# Patient Record
Sex: FEMALE | Race: WHITE | Employment: OTHER
[De-identification: names, ages, dates, MRNs, and addresses within clinical notes are randomized per-mention and may not be internally consistent; named-entity substitution may affect disease eponyms.]

---

## 2017-01-01 ENCOUNTER — HOME CARE VISIT (OUTPATIENT)
Dept: SCHEDULING | Facility: HOME HEALTH | Age: 82
End: 2017-01-01
Payer: MEDICARE

## 2017-01-01 ENCOUNTER — APPOINTMENT (OUTPATIENT)
Dept: GENERAL RADIOLOGY | Age: 82
End: 2017-01-01
Attending: NURSE PRACTITIONER
Payer: MEDICARE

## 2017-01-01 ENCOUNTER — HOSPICE ADMISSION (OUTPATIENT)
Dept: HOSPICE | Facility: HOSPICE | Age: 82
End: 2017-01-01

## 2017-01-01 ENCOUNTER — HOME CARE VISIT (OUTPATIENT)
Dept: HOSPICE | Facility: HOSPICE | Age: 82
End: 2017-01-01
Payer: MEDICARE

## 2017-01-01 ENCOUNTER — HOSPITAL ENCOUNTER (INPATIENT)
Age: 82
LOS: 1 days | Discharge: NURSING, REHAB OR LONG-TERM CARE | DRG: 190 | End: 2017-08-21
Attending: EMERGENCY MEDICINE | Admitting: HOSPITALIST
Payer: MEDICARE

## 2017-01-01 ENCOUNTER — APPOINTMENT (OUTPATIENT)
Dept: GENERAL RADIOLOGY | Age: 82
DRG: 177 | End: 2017-01-01
Attending: EMERGENCY MEDICINE
Payer: MEDICARE

## 2017-01-01 ENCOUNTER — HOSPICE ADMISSION (OUTPATIENT)
Dept: HOSPICE | Facility: HOSPICE | Age: 82
End: 2017-01-01
Payer: MEDICARE

## 2017-01-01 ENCOUNTER — HOSPITAL ENCOUNTER (EMERGENCY)
Age: 82
Discharge: HOME | End: 2017-06-18
Attending: EMERGENCY MEDICINE
Payer: MEDICARE

## 2017-01-01 ENCOUNTER — APPOINTMENT (OUTPATIENT)
Dept: CT IMAGING | Age: 82
End: 2017-01-01
Attending: NURSE PRACTITIONER
Payer: MEDICARE

## 2017-01-01 ENCOUNTER — APPOINTMENT (OUTPATIENT)
Dept: GENERAL RADIOLOGY | Age: 82
DRG: 190 | End: 2017-01-01
Attending: EMERGENCY MEDICINE
Payer: MEDICARE

## 2017-01-01 ENCOUNTER — HOSPITAL ENCOUNTER (INPATIENT)
Age: 82
LOS: 6 days | Discharge: HOSPICE | DRG: 177 | End: 2017-11-18
Attending: EMERGENCY MEDICINE | Admitting: HOSPITALIST
Payer: MEDICARE

## 2017-01-01 VITALS
WEIGHT: 90 LBS | RESPIRATION RATE: 20 BRPM | SYSTOLIC BLOOD PRESSURE: 138 MMHG | HEART RATE: 57 BPM | HEIGHT: 56 IN | BODY MASS INDEX: 20.24 KG/M2 | OXYGEN SATURATION: 95 % | DIASTOLIC BLOOD PRESSURE: 71 MMHG | TEMPERATURE: 98.3 F

## 2017-01-01 VITALS
RESPIRATION RATE: 18 BRPM | HEART RATE: 88 BPM | OXYGEN SATURATION: 97 % | SYSTOLIC BLOOD PRESSURE: 104 MMHG | DIASTOLIC BLOOD PRESSURE: 56 MMHG | TEMPERATURE: 98.6 F

## 2017-01-01 VITALS
SYSTOLIC BLOOD PRESSURE: 130 MMHG | BODY MASS INDEX: 19.63 KG/M2 | HEART RATE: 77 BPM | OXYGEN SATURATION: 95 % | WEIGHT: 100 LBS | TEMPERATURE: 98.4 F | RESPIRATION RATE: 14 BRPM | HEIGHT: 60 IN | DIASTOLIC BLOOD PRESSURE: 58 MMHG

## 2017-01-01 VITALS
OXYGEN SATURATION: 90 % | TEMPERATURE: 97.2 F | HEART RATE: 88 BPM | DIASTOLIC BLOOD PRESSURE: 62 MMHG | RESPIRATION RATE: 16 BRPM | SYSTOLIC BLOOD PRESSURE: 100 MMHG

## 2017-01-01 VITALS
SYSTOLIC BLOOD PRESSURE: 98 MMHG | HEART RATE: 88 BPM | DIASTOLIC BLOOD PRESSURE: 52 MMHG | OXYGEN SATURATION: 98 % | TEMPERATURE: 98.1 F | RESPIRATION RATE: 16 BRPM

## 2017-01-01 VITALS
RESPIRATION RATE: 14 BRPM | HEART RATE: 100 BPM | BODY MASS INDEX: 16.22 KG/M2 | OXYGEN SATURATION: 77 % | HEIGHT: 64 IN | WEIGHT: 95 LBS | DIASTOLIC BLOOD PRESSURE: 87 MMHG | SYSTOLIC BLOOD PRESSURE: 111 MMHG | TEMPERATURE: 98.2 F

## 2017-01-01 VITALS — HEART RATE: 69 BPM | OXYGEN SATURATION: 96 % | RESPIRATION RATE: 18 BRPM | TEMPERATURE: 98 F

## 2017-01-01 VITALS
TEMPERATURE: 99.1 F | OXYGEN SATURATION: 99 % | HEART RATE: 88 BPM | DIASTOLIC BLOOD PRESSURE: 52 MMHG | SYSTOLIC BLOOD PRESSURE: 98 MMHG | RESPIRATION RATE: 14 BRPM

## 2017-01-01 VITALS
TEMPERATURE: 99.2 F | OXYGEN SATURATION: 94 % | HEART RATE: 87 BPM | WEIGHT: 100 LBS | DIASTOLIC BLOOD PRESSURE: 62 MMHG | RESPIRATION RATE: 18 BRPM | BODY MASS INDEX: 18.4 KG/M2 | SYSTOLIC BLOOD PRESSURE: 113 MMHG | HEIGHT: 62 IN

## 2017-01-01 VITALS — OXYGEN SATURATION: 88 % | HEART RATE: 78 BPM | TEMPERATURE: 97.1 F | RESPIRATION RATE: 16 BRPM

## 2017-01-01 DIAGNOSIS — R53.81 DEBILITY: ICD-10-CM

## 2017-01-01 DIAGNOSIS — R19.7 DIARRHEA, UNSPECIFIED TYPE: ICD-10-CM

## 2017-01-01 DIAGNOSIS — Z71.89 ACP (ADVANCE CARE PLANNING): ICD-10-CM

## 2017-01-01 DIAGNOSIS — F02.81 ALZHEIMER'S DEMENTIA WITH BEHAVIORAL DISTURBANCE, UNSPECIFIED TIMING OF DEMENTIA ONSET: ICD-10-CM

## 2017-01-01 DIAGNOSIS — R19.5 POSITIVE OCCULT STOOL BLOOD TEST: ICD-10-CM

## 2017-01-01 DIAGNOSIS — R41.82 ALTERED MENTAL STATUS, UNSPECIFIED ALTERED MENTAL STATUS TYPE: ICD-10-CM

## 2017-01-01 DIAGNOSIS — T17.908A ASPIRATION INTO AIRWAY, INITIAL ENCOUNTER: Primary | ICD-10-CM

## 2017-01-01 DIAGNOSIS — G30.9 DEMENTIA IN ALZHEIMER'S DISEASE (HCC): ICD-10-CM

## 2017-01-01 DIAGNOSIS — R13.10 DYSPHAGIA, UNSPECIFIED TYPE: ICD-10-CM

## 2017-01-01 DIAGNOSIS — F02.80 DEMENTIA IN ALZHEIMER'S DISEASE (HCC): ICD-10-CM

## 2017-01-01 DIAGNOSIS — J18.9 PNEUMONIA DUE TO INFECTIOUS ORGANISM, UNSPECIFIED LATERALITY, UNSPECIFIED PART OF LUNG: ICD-10-CM

## 2017-01-01 DIAGNOSIS — R11.2 NAUSEA AND VOMITING IN ADULT: Primary | ICD-10-CM

## 2017-01-01 DIAGNOSIS — G30.9 ALZHEIMER'S DEMENTIA WITH BEHAVIORAL DISTURBANCE, UNSPECIFIED TIMING OF DEMENTIA ONSET: ICD-10-CM

## 2017-01-01 DIAGNOSIS — J18.9 HCAP (HEALTHCARE-ASSOCIATED PNEUMONIA): Primary | ICD-10-CM

## 2017-01-01 LAB
ALBUMIN SERPL BCP-MCNC: 3.1 G/DL (ref 3.4–5)
ALBUMIN SERPL-MCNC: 3 G/DL (ref 3.4–5)
ALBUMIN/GLOB SERPL: 0.6 {RATIO} (ref 0.8–1.7)
ALBUMIN/GLOB SERPL: 0.7 {RATIO} (ref 0.8–1.7)
ALP SERPL-CCNC: 104 U/L (ref 45–117)
ALP SERPL-CCNC: 115 U/L (ref 45–117)
ALT SERPL-CCNC: 16 U/L (ref 13–56)
ALT SERPL-CCNC: 26 U/L (ref 13–56)
ANION GAP BLD CALC-SCNC: 11 MMOL/L (ref 3–18)
ANION GAP SERPL CALC-SCNC: 10 MMOL/L (ref 3–18)
ANION GAP SERPL CALC-SCNC: 11 MMOL/L (ref 3–18)
ANION GAP SERPL CALC-SCNC: 7 MMOL/L (ref 3–18)
ANION GAP SERPL CALC-SCNC: 7 MMOL/L (ref 3–18)
ANION GAP SERPL CALC-SCNC: 8 MMOL/L (ref 3–18)
ANION GAP SERPL CALC-SCNC: 9 MMOL/L (ref 3–18)
ANION GAP SERPL CALC-SCNC: 9 MMOL/L (ref 3–18)
APPEARANCE UR: CLEAR
AST SERPL W P-5'-P-CCNC: 28 U/L (ref 15–37)
AST SERPL-CCNC: 18 U/L (ref 15–37)
ATRIAL RATE: 106 BPM
ATRIAL RATE: 107 BPM
ATRIAL RATE: 110 BPM
BACTERIA SPEC CULT: ABNORMAL
BACTERIA SPEC CULT: NORMAL
BASOPHILS # BLD AUTO: 0 K/UL (ref 0–0.06)
BASOPHILS # BLD: 0 % (ref 0–2)
BASOPHILS # BLD: 0 K/UL (ref 0–0.06)
BASOPHILS NFR BLD: 0 % (ref 0–2)
BILIRUB SERPL-MCNC: 0.3 MG/DL (ref 0.2–1)
BILIRUB SERPL-MCNC: 0.3 MG/DL (ref 0.2–1)
BILIRUB UR QL: NEGATIVE
BUN SERPL-MCNC: 11 MG/DL (ref 7–18)
BUN SERPL-MCNC: 11 MG/DL (ref 7–18)
BUN SERPL-MCNC: 13 MG/DL (ref 7–18)
BUN SERPL-MCNC: 14 MG/DL (ref 7–18)
BUN SERPL-MCNC: 17 MG/DL (ref 7–18)
BUN SERPL-MCNC: 23 MG/DL (ref 7–18)
BUN SERPL-MCNC: 23 MG/DL (ref 7–18)
BUN SERPL-MCNC: 27 MG/DL (ref 7–18)
BUN/CREAT SERPL: 18 (ref 12–20)
BUN/CREAT SERPL: 19 (ref 12–20)
BUN/CREAT SERPL: 20 (ref 12–20)
BUN/CREAT SERPL: 20 (ref 12–20)
BUN/CREAT SERPL: 21 (ref 12–20)
BUN/CREAT SERPL: 30 (ref 12–20)
BUN/CREAT SERPL: 31 (ref 12–20)
BUN/CREAT SERPL: 32 (ref 12–20)
CALCIUM SERPL-MCNC: 8.4 MG/DL (ref 8.5–10.1)
CALCIUM SERPL-MCNC: 8.5 MG/DL (ref 8.5–10.1)
CALCIUM SERPL-MCNC: 8.6 MG/DL (ref 8.5–10.1)
CALCIUM SERPL-MCNC: 8.7 MG/DL (ref 8.5–10.1)
CALCIUM SERPL-MCNC: 9.5 MG/DL (ref 8.5–10.1)
CALCIUM SERPL-MCNC: 9.5 MG/DL (ref 8.5–10.1)
CALCULATED P AXIS, ECG09: 19 DEGREES
CALCULATED P AXIS, ECG09: 31 DEGREES
CALCULATED P AXIS, ECG09: 56 DEGREES
CALCULATED R AXIS, ECG10: -50 DEGREES
CALCULATED R AXIS, ECG10: -50 DEGREES
CALCULATED R AXIS, ECG10: -53 DEGREES
CALCULATED T AXIS, ECG11: 33 DEGREES
CALCULATED T AXIS, ECG11: 54 DEGREES
CALCULATED T AXIS, ECG11: 79 DEGREES
CHLORIDE SERPL-SCNC: 104 MMOL/L (ref 100–108)
CHLORIDE SERPL-SCNC: 106 MMOL/L (ref 100–108)
CHLORIDE SERPL-SCNC: 106 MMOL/L (ref 100–108)
CHLORIDE SERPL-SCNC: 108 MMOL/L (ref 100–108)
CHLORIDE SERPL-SCNC: 109 MMOL/L (ref 100–108)
CHLORIDE SERPL-SCNC: 110 MMOL/L (ref 100–108)
CK MB CFR SERPL CALC: 2 % (ref 0–4)
CK MB CFR SERPL CALC: NORMAL % (ref 0–4)
CK MB SERPL-MCNC: 2.7 NG/ML (ref 5–25)
CK MB SERPL-MCNC: <1 NG/ML (ref 5–25)
CK SERPL-CCNC: 136 U/L (ref 26–192)
CK SERPL-CCNC: 56 U/L (ref 26–192)
CO2 SERPL-SCNC: 21 MMOL/L (ref 21–32)
CO2 SERPL-SCNC: 24 MMOL/L (ref 21–32)
CO2 SERPL-SCNC: 25 MMOL/L (ref 21–32)
CO2 SERPL-SCNC: 25 MMOL/L (ref 21–32)
CO2 SERPL-SCNC: 26 MMOL/L (ref 21–32)
CO2 SERPL-SCNC: 26 MMOL/L (ref 21–32)
CO2 SERPL-SCNC: 27 MMOL/L (ref 21–32)
CO2 SERPL-SCNC: 28 MMOL/L (ref 21–32)
COLOR UR: YELLOW
CREAT SERPL-MCNC: 0.58 MG/DL (ref 0.6–1.3)
CREAT SERPL-MCNC: 0.6 MG/DL (ref 0.6–1.3)
CREAT SERPL-MCNC: 0.64 MG/DL (ref 0.6–1.3)
CREAT SERPL-MCNC: 0.66 MG/DL (ref 0.6–1.3)
CREAT SERPL-MCNC: 0.73 MG/DL (ref 0.6–1.3)
CREAT SERPL-MCNC: 0.76 MG/DL (ref 0.6–1.3)
CREAT SERPL-MCNC: 0.86 MG/DL (ref 0.6–1.3)
CREAT SERPL-MCNC: 0.86 MG/DL (ref 0.6–1.3)
DATE LAST DOSE: ABNORMAL
DATE LAST DOSE: NORMAL
DIAGNOSIS, 93000: NORMAL
DIFFERENTIAL METHOD BLD: ABNORMAL
EOSINOPHIL # BLD: 0 K/UL (ref 0–0.4)
EOSINOPHIL # BLD: 0.1 K/UL (ref 0–0.4)
EOSINOPHIL NFR BLD: 0 % (ref 0–5)
EOSINOPHIL NFR BLD: 1 % (ref 0–5)
ERYTHROCYTE [DISTWIDTH] IN BLOOD BY AUTOMATED COUNT: 17.2 % (ref 11.6–14.5)
ERYTHROCYTE [DISTWIDTH] IN BLOOD BY AUTOMATED COUNT: 17.2 % (ref 11.6–14.5)
ERYTHROCYTE [DISTWIDTH] IN BLOOD BY AUTOMATED COUNT: 17.5 % (ref 11.6–14.5)
ERYTHROCYTE [DISTWIDTH] IN BLOOD BY AUTOMATED COUNT: 17.6 % (ref 11.6–14.5)
ERYTHROCYTE [DISTWIDTH] IN BLOOD BY AUTOMATED COUNT: 17.7 % (ref 11.6–14.5)
ERYTHROCYTE [DISTWIDTH] IN BLOOD BY AUTOMATED COUNT: 17.8 % (ref 11.6–14.5)
GLOBULIN SER CALC-MCNC: 4.7 G/DL (ref 2–4)
GLOBULIN SER CALC-MCNC: 5.4 G/DL (ref 2–4)
GLUCOSE BLD STRIP.AUTO-MCNC: 136 MG/DL (ref 70–110)
GLUCOSE SERPL-MCNC: 119 MG/DL (ref 74–99)
GLUCOSE SERPL-MCNC: 126 MG/DL (ref 74–99)
GLUCOSE SERPL-MCNC: 128 MG/DL (ref 74–99)
GLUCOSE SERPL-MCNC: 129 MG/DL (ref 74–99)
GLUCOSE SERPL-MCNC: 90 MG/DL (ref 74–99)
GLUCOSE SERPL-MCNC: 90 MG/DL (ref 74–99)
GLUCOSE SERPL-MCNC: 92 MG/DL (ref 74–99)
GLUCOSE SERPL-MCNC: 95 MG/DL (ref 74–99)
GLUCOSE UR STRIP.AUTO-MCNC: NEGATIVE MG/DL
GRAM STN SPEC: ABNORMAL
HCT VFR BLD AUTO: 24.6 % (ref 35–45)
HCT VFR BLD AUTO: 24.6 % (ref 35–45)
HCT VFR BLD AUTO: 26.8 % (ref 35–45)
HCT VFR BLD AUTO: 29.6 % (ref 35–45)
HCT VFR BLD AUTO: 31.1 % (ref 35–45)
HCT VFR BLD AUTO: 33.1 % (ref 35–45)
HGB BLD-MCNC: 10.1 G/DL (ref 12–16)
HGB BLD-MCNC: 7.3 G/DL (ref 12–16)
HGB BLD-MCNC: 7.4 G/DL (ref 12–16)
HGB BLD-MCNC: 8.2 G/DL (ref 12–16)
HGB BLD-MCNC: 9 G/DL (ref 12–16)
HGB BLD-MCNC: 9.6 G/DL (ref 12–16)
HGB UR QL STRIP: NEGATIVE
INR PPP: 1.1 (ref 0.8–1.2)
KETONES UR QL STRIP.AUTO: NEGATIVE MG/DL
LACTATE BLD-SCNC: 1 MMOL/L (ref 0.4–2)
LACTATE BLD-SCNC: 1.5 MMOL/L (ref 0.4–2)
LEUKOCYTE ESTERASE UR QL STRIP.AUTO: NEGATIVE
LYMPHOCYTES # BLD AUTO: 4 % (ref 21–52)
LYMPHOCYTES # BLD: 0.4 K/UL (ref 0.9–3.6)
LYMPHOCYTES # BLD: 1.2 K/UL (ref 0.9–3.6)
LYMPHOCYTES # BLD: 1.3 K/UL (ref 0.9–3.6)
LYMPHOCYTES # BLD: 1.5 K/UL (ref 0.9–3.6)
LYMPHOCYTES NFR BLD: 10 % (ref 21–52)
LYMPHOCYTES NFR BLD: 12 % (ref 21–52)
LYMPHOCYTES NFR BLD: 15 % (ref 21–52)
MAGNESIUM SERPL-MCNC: 2 MG/DL (ref 1.6–2.6)
MCH RBC QN AUTO: 24.9 PG (ref 24–34)
MCH RBC QN AUTO: 25.3 PG (ref 24–34)
MCH RBC QN AUTO: 25.3 PG (ref 24–34)
MCH RBC QN AUTO: 25.6 PG (ref 24–34)
MCH RBC QN AUTO: 25.7 PG (ref 24–34)
MCH RBC QN AUTO: 25.8 PG (ref 24–34)
MCHC RBC AUTO-ENTMCNC: 29.7 G/DL (ref 31–37)
MCHC RBC AUTO-ENTMCNC: 30.1 G/DL (ref 31–37)
MCHC RBC AUTO-ENTMCNC: 30.4 G/DL (ref 31–37)
MCHC RBC AUTO-ENTMCNC: 30.5 G/DL (ref 31–37)
MCHC RBC AUTO-ENTMCNC: 30.6 G/DL (ref 31–37)
MCHC RBC AUTO-ENTMCNC: 30.9 G/DL (ref 31–37)
MCV RBC AUTO: 82.9 FL (ref 74–97)
MCV RBC AUTO: 83.1 FL (ref 74–97)
MCV RBC AUTO: 84 FL (ref 74–97)
MCV RBC AUTO: 84 FL (ref 74–97)
MCV RBC AUTO: 84.2 FL (ref 74–97)
MCV RBC AUTO: 84.7 FL (ref 74–97)
MONOCYTES # BLD: 0.2 K/UL (ref 0.05–1.2)
MONOCYTES # BLD: 0.5 K/UL (ref 0.05–1.2)
MONOCYTES # BLD: 0.5 K/UL (ref 0.05–1.2)
MONOCYTES # BLD: 0.6 K/UL (ref 0.05–1.2)
MONOCYTES NFR BLD AUTO: 2 % (ref 3–10)
MONOCYTES NFR BLD: 4 % (ref 3–10)
MONOCYTES NFR BLD: 6 % (ref 3–10)
MONOCYTES NFR BLD: 6 % (ref 3–10)
NEUTS SEG # BLD: 13.1 K/UL (ref 1.8–8)
NEUTS SEG # BLD: 7.3 K/UL (ref 1.8–8)
NEUTS SEG # BLD: 8.1 K/UL (ref 1.8–8)
NEUTS SEG # BLD: 9.4 K/UL (ref 1.8–8)
NEUTS SEG NFR BLD AUTO: 94 % (ref 40–73)
NEUTS SEG NFR BLD: 78 % (ref 40–73)
NEUTS SEG NFR BLD: 81 % (ref 40–73)
NEUTS SEG NFR BLD: 85 % (ref 40–73)
NITRITE UR QL STRIP.AUTO: NEGATIVE
P-R INTERVAL, ECG05: 124 MS
P-R INTERVAL, ECG05: 142 MS
P-R INTERVAL, ECG05: 158 MS
PH UR STRIP: 5 [PH] (ref 5–8)
PH UR STRIP: 5 [PH] (ref 5–8)
PH UR STRIP: 6 [PH] (ref 5–8)
PLATELET # BLD AUTO: 391 K/UL (ref 135–420)
PLATELET # BLD AUTO: 438 K/UL (ref 135–420)
PLATELET # BLD AUTO: 441 K/UL (ref 135–420)
PLATELET # BLD AUTO: 501 K/UL (ref 135–420)
PLATELET # BLD AUTO: 516 K/UL (ref 135–420)
PLATELET # BLD AUTO: 525 K/UL (ref 135–420)
PMV BLD AUTO: 8.5 FL (ref 9.2–11.8)
PMV BLD AUTO: 8.8 FL (ref 9.2–11.8)
PMV BLD AUTO: 8.9 FL (ref 9.2–11.8)
PMV BLD AUTO: 9 FL (ref 9.2–11.8)
PMV BLD AUTO: 9.1 FL (ref 9.2–11.8)
PMV BLD AUTO: 9.1 FL (ref 9.2–11.8)
POTASSIUM SERPL-SCNC: 3.5 MMOL/L (ref 3.5–5.5)
POTASSIUM SERPL-SCNC: 3.5 MMOL/L (ref 3.5–5.5)
POTASSIUM SERPL-SCNC: 3.9 MMOL/L (ref 3.5–5.5)
POTASSIUM SERPL-SCNC: 4.1 MMOL/L (ref 3.5–5.5)
POTASSIUM SERPL-SCNC: 4.2 MMOL/L (ref 3.5–5.5)
PROT SERPL-MCNC: 7.8 G/DL (ref 6.4–8.2)
PROT SERPL-MCNC: 8.4 G/DL (ref 6.4–8.2)
PROT UR STRIP-MCNC: NEGATIVE MG/DL
PROTHROMBIN TIME: 14 SEC (ref 11.5–15.2)
Q-T INTERVAL, ECG07: 328 MS
Q-T INTERVAL, ECG07: 336 MS
Q-T INTERVAL, ECG07: 336 MS
QRS DURATION, ECG06: 100 MS
QRS DURATION, ECG06: 86 MS
QRS DURATION, ECG06: 90 MS
QTC CALCULATION (BEZET), ECG08: 437 MS
QTC CALCULATION (BEZET), ECG08: 446 MS
QTC CALCULATION (BEZET), ECG08: 454 MS
RBC # BLD AUTO: 2.92 M/UL (ref 4.2–5.3)
RBC # BLD AUTO: 2.93 M/UL (ref 4.2–5.3)
RBC # BLD AUTO: 3.19 M/UL (ref 4.2–5.3)
RBC # BLD AUTO: 3.56 M/UL (ref 4.2–5.3)
RBC # BLD AUTO: 3.75 M/UL (ref 4.2–5.3)
RBC # BLD AUTO: 3.91 M/UL (ref 4.2–5.3)
REPORTED DOSE,DOSE: ABNORMAL UNITS
REPORTED DOSE,DOSE: NORMAL UNITS
REPORTED DOSE/TIME,TMG: 1600
REPORTED DOSE/TIME,TMG: 500
SERVICE CMNT-IMP: ABNORMAL
SERVICE CMNT-IMP: NORMAL
SODIUM SERPL-SCNC: 139 MMOL/L (ref 136–145)
SODIUM SERPL-SCNC: 140 MMOL/L (ref 136–145)
SODIUM SERPL-SCNC: 142 MMOL/L (ref 136–145)
SODIUM SERPL-SCNC: 143 MMOL/L (ref 136–145)
SODIUM SERPL-SCNC: 143 MMOL/L (ref 136–145)
SODIUM SERPL-SCNC: 144 MMOL/L (ref 136–145)
SP GR UR REFRACTOMETRY: 1.02 (ref 1–1.03)
TROPONIN I SERPL-MCNC: <0.02 NG/ML (ref 0–0.04)
TROPONIN I SERPL-MCNC: <0.02 NG/ML (ref 0–0.04)
UROBILINOGEN UR QL STRIP.AUTO: 0.2 EU/DL (ref 0.2–1)
UROBILINOGEN UR QL STRIP.AUTO: 0.2 EU/DL (ref 0.2–1)
UROBILINOGEN UR QL STRIP.AUTO: 1 EU/DL (ref 0.2–1)
VANCOMYCIN TROUGH SERPL-MCNC: 13.7 UG/ML (ref 10–20)
VANCOMYCIN TROUGH SERPL-MCNC: 7 UG/ML (ref 10–20)
VENTRICULAR RATE, ECG03: 106 BPM
VENTRICULAR RATE, ECG03: 107 BPM
VENTRICULAR RATE, ECG03: 110 BPM
WBC # BLD AUTO: 10 K/UL (ref 4.6–13.2)
WBC # BLD AUTO: 10.1 K/UL (ref 4.6–13.2)
WBC # BLD AUTO: 15.3 K/UL (ref 4.6–13.2)
WBC # BLD AUTO: 7.5 K/UL (ref 4.6–13.2)
WBC # BLD AUTO: 8.5 K/UL (ref 4.6–13.2)
WBC # BLD AUTO: 9.3 K/UL (ref 4.6–13.2)

## 2017-01-01 PROCEDURE — 74011250636 HC RX REV CODE- 250/636: Performed by: EMERGENCY MEDICINE

## 2017-01-01 PROCEDURE — G0156 HHCP-SVS OF AIDE,EA 15 MIN: HCPCS

## 2017-01-01 PROCEDURE — 0651 HSPC ROUTINE HOME CARE

## 2017-01-01 PROCEDURE — 74011000258 HC RX REV CODE- 258: Performed by: HOSPITALIST

## 2017-01-01 PROCEDURE — 65270000029 HC RM PRIVATE

## 2017-01-01 PROCEDURE — 77030011943

## 2017-01-01 PROCEDURE — T4541 LARGE DISPOSABLE UNDERPAD: HCPCS

## 2017-01-01 PROCEDURE — 77010033678 HC OXYGEN DAILY

## 2017-01-01 PROCEDURE — A9270 NON-COVERED ITEM OR SERVICE: HCPCS

## 2017-01-01 PROCEDURE — 99218 HC RM OBSERVATION: CPT

## 2017-01-01 PROCEDURE — A4927 NON-STERILE GLOVES: HCPCS

## 2017-01-01 PROCEDURE — G0299 HHS/HOSPICE OF RN EA 15 MIN: HCPCS

## 2017-01-01 PROCEDURE — 77030020253 HC SOL INJ D545NS .05 DEX .45 SAL

## 2017-01-01 PROCEDURE — 74011250637 HC RX REV CODE- 250/637: Performed by: HOSPITALIST

## 2017-01-01 PROCEDURE — 36415 COLL VENOUS BLD VENIPUNCTURE: CPT | Performed by: HOSPITALIST

## 2017-01-01 PROCEDURE — 80202 ASSAY OF VANCOMYCIN: CPT | Performed by: HOSPITALIST

## 2017-01-01 PROCEDURE — 87086 URINE CULTURE/COLONY COUNT: CPT | Performed by: EMERGENCY MEDICINE

## 2017-01-01 PROCEDURE — 74011000258 HC RX REV CODE- 258: Performed by: EMERGENCY MEDICINE

## 2017-01-01 PROCEDURE — 93005 ELECTROCARDIOGRAM TRACING: CPT

## 2017-01-01 PROCEDURE — 99285 EMERGENCY DEPT VISIT HI MDM: CPT

## 2017-01-01 PROCEDURE — B4100 FOOD THICKENER ORAL: HCPCS

## 2017-01-01 PROCEDURE — 80053 COMPREHEN METABOLIC PANEL: CPT | Performed by: NURSE PRACTITIONER

## 2017-01-01 PROCEDURE — 85027 COMPLETE CBC AUTOMATED: CPT | Performed by: HOSPITALIST

## 2017-01-01 PROCEDURE — 77030020256 HC SOL INJ NACL 0.9%  500ML

## 2017-01-01 PROCEDURE — 74011250636 HC RX REV CODE- 250/636: Performed by: HOSPITALIST

## 2017-01-01 PROCEDURE — T4522 ADULT SIZE BRIEF/DIAPER MED: HCPCS

## 2017-01-01 PROCEDURE — 85025 COMPLETE CBC W/AUTO DIFF WBC: CPT | Performed by: NURSE PRACTITIONER

## 2017-01-01 PROCEDURE — 74011000258 HC RX REV CODE- 258: Performed by: FAMILY MEDICINE

## 2017-01-01 PROCEDURE — 81003 URINALYSIS AUTO W/O SCOPE: CPT | Performed by: EMERGENCY MEDICINE

## 2017-01-01 PROCEDURE — 51701 INSERT BLADDER CATHETER: CPT

## 2017-01-01 PROCEDURE — A6250 SKIN SEAL PROTECT MOISTURIZR: HCPCS

## 2017-01-01 PROCEDURE — 87040 BLOOD CULTURE FOR BACTERIA: CPT | Performed by: EMERGENCY MEDICINE

## 2017-01-01 PROCEDURE — 96365 THER/PROPH/DIAG IV INF INIT: CPT

## 2017-01-01 PROCEDURE — 74011000250 HC RX REV CODE- 250: Performed by: EMERGENCY MEDICINE

## 2017-01-01 PROCEDURE — 80053 COMPREHEN METABOLIC PANEL: CPT | Performed by: EMERGENCY MEDICINE

## 2017-01-01 PROCEDURE — 87186 SC STD MICRODIL/AGAR DIL: CPT | Performed by: EMERGENCY MEDICINE

## 2017-01-01 PROCEDURE — 3336500001 HSPC ELECTION

## 2017-01-01 PROCEDURE — 80048 BASIC METABOLIC PNL TOTAL CA: CPT | Performed by: HOSPITALIST

## 2017-01-01 PROCEDURE — 71010 XR CHEST PORT: CPT

## 2017-01-01 PROCEDURE — 96361 HYDRATE IV INFUSION ADD-ON: CPT

## 2017-01-01 PROCEDURE — 74011250636 HC RX REV CODE- 250/636: Performed by: FAMILY MEDICINE

## 2017-01-01 PROCEDURE — 96375 TX/PRO/DX INJ NEW DRUG ADDON: CPT

## 2017-01-01 PROCEDURE — 99284 EMERGENCY DEPT VISIT MOD MDM: CPT

## 2017-01-01 PROCEDURE — HOSPICE MEDICATION HC HH HOSPICE MEDICATION

## 2017-01-01 PROCEDURE — 36415 COLL VENOUS BLD VENIPUNCTURE: CPT | Performed by: FAMILY MEDICINE

## 2017-01-01 PROCEDURE — 85025 COMPLETE CBC W/AUTO DIFF WBC: CPT | Performed by: FAMILY MEDICINE

## 2017-01-01 PROCEDURE — 92610 EVALUATE SWALLOWING FUNCTION: CPT

## 2017-01-01 PROCEDURE — 87077 CULTURE AEROBIC IDENTIFY: CPT | Performed by: EMERGENCY MEDICINE

## 2017-01-01 PROCEDURE — 87070 CULTURE OTHR SPECIMN AEROBIC: CPT | Performed by: EMERGENCY MEDICINE

## 2017-01-01 PROCEDURE — 85025 COMPLETE CBC W/AUTO DIFF WBC: CPT | Performed by: EMERGENCY MEDICINE

## 2017-01-01 PROCEDURE — 80048 BASIC METABOLIC PNL TOTAL CA: CPT | Performed by: FAMILY MEDICINE

## 2017-01-01 PROCEDURE — 92526 ORAL FUNCTION THERAPY: CPT

## 2017-01-01 PROCEDURE — 83605 ASSAY OF LACTIC ACID: CPT

## 2017-01-01 PROCEDURE — 83735 ASSAY OF MAGNESIUM: CPT | Performed by: NURSE PRACTITIONER

## 2017-01-01 PROCEDURE — 51702 INSERT TEMP BLADDER CATH: CPT

## 2017-01-01 PROCEDURE — 77030034849

## 2017-01-01 PROCEDURE — G0155 HHCP-SVS OF CSW,EA 15 MIN: HCPCS

## 2017-01-01 PROCEDURE — G0300 HHS/HOSPICE OF LPN EA 15 MIN: HCPCS

## 2017-01-01 PROCEDURE — 77030011256 HC DRSG MEPILEX <16IN NO BORD MOLN -A

## 2017-01-01 PROCEDURE — 82550 ASSAY OF CK (CPK): CPT | Performed by: EMERGENCY MEDICINE

## 2017-01-01 PROCEDURE — 82550 ASSAY OF CK (CPK): CPT | Performed by: NURSE PRACTITIONER

## 2017-01-01 PROCEDURE — 80048 BASIC METABOLIC PNL TOTAL CA: CPT | Performed by: EMERGENCY MEDICINE

## 2017-01-01 PROCEDURE — 82962 GLUCOSE BLOOD TEST: CPT

## 2017-01-01 PROCEDURE — 81003 URINALYSIS AUTO W/O SCOPE: CPT | Performed by: NURSE PRACTITIONER

## 2017-01-01 PROCEDURE — 74177 CT ABD & PELVIS W/CONTRAST: CPT

## 2017-01-01 PROCEDURE — 85610 PROTHROMBIN TIME: CPT | Performed by: FAMILY MEDICINE

## 2017-01-01 PROCEDURE — 36415 COLL VENOUS BLD VENIPUNCTURE: CPT | Performed by: NURSE PRACTITIONER

## 2017-01-01 PROCEDURE — 80048 BASIC METABOLIC PNL TOTAL CA: CPT | Performed by: NURSE PRACTITIONER

## 2017-01-01 PROCEDURE — 74011636320 HC RX REV CODE- 636/320: Performed by: EMERGENCY MEDICINE

## 2017-01-01 PROCEDURE — 76450000000

## 2017-01-01 RX ORDER — ATORVASTATIN CALCIUM 20 MG/1
20 TABLET, FILM COATED ORAL
Status: DISCONTINUED | OUTPATIENT
Start: 2017-01-01 | End: 2017-01-01

## 2017-01-01 RX ORDER — AMOXICILLIN 250 MG
1 CAPSULE ORAL 2 TIMES DAILY
COMMUNITY
End: 2017-01-01 | Stop reason: SDUPTHER

## 2017-01-01 RX ORDER — ACETAMINOPHEN 650 MG/1
650 SUPPOSITORY RECTAL
Status: DISCONTINUED | OUTPATIENT
Start: 2017-01-01 | End: 2017-01-01 | Stop reason: HOSPADM

## 2017-01-01 RX ORDER — GUAIFENESIN 100 MG/5ML
300 SOLUTION ORAL
COMMUNITY
End: 2017-01-01

## 2017-01-01 RX ORDER — LANOLIN ALCOHOL/MO/W.PET/CERES
3 CREAM (GRAM) TOPICAL
Status: DISCONTINUED | OUTPATIENT
Start: 2017-01-01 | End: 2017-01-01 | Stop reason: HOSPADM

## 2017-01-01 RX ORDER — ADHESIVE BANDAGE
30 BANDAGE TOPICAL
COMMUNITY
End: 2018-01-01

## 2017-01-01 RX ORDER — RANITIDINE 150 MG/1
150 TABLET, FILM COATED ORAL DAILY
Status: DISCONTINUED | OUTPATIENT
Start: 2017-01-01 | End: 2017-01-01 | Stop reason: HOSPADM

## 2017-01-01 RX ORDER — ESCITALOPRAM OXALATE 10 MG/1
10 TABLET ORAL DAILY
Status: DISCONTINUED | OUTPATIENT
Start: 2017-01-01 | End: 2017-01-01 | Stop reason: HOSPADM

## 2017-01-01 RX ORDER — MAG HYDROX/ALUMINUM HYD/SIMETH 200-200-20
30 SUSPENSION, ORAL (FINAL DOSE FORM) ORAL
Status: DISCONTINUED | OUTPATIENT
Start: 2017-01-01 | End: 2017-01-01 | Stop reason: HOSPADM

## 2017-01-01 RX ORDER — ONDANSETRON 2 MG/ML
4 INJECTION INTRAMUSCULAR; INTRAVENOUS
Status: DISCONTINUED | OUTPATIENT
Start: 2017-01-01 | End: 2017-01-01 | Stop reason: HOSPADM

## 2017-01-01 RX ORDER — MORPHINE SULFATE 20 MG/ML
5 SOLUTION ORAL
Qty: 30 ML | Refills: 0 | Status: SHIPPED | OUTPATIENT
Start: 2017-01-01

## 2017-01-01 RX ORDER — ONDANSETRON 4 MG/1
4 TABLET, ORALLY DISINTEGRATING ORAL
Qty: 10 TAB | Refills: 0 | Status: SHIPPED | OUTPATIENT
Start: 2017-01-01

## 2017-01-01 RX ORDER — MEMANTINE HYDROCHLORIDE 28 MG/1
28 CAPSULE, EXTENDED RELEASE ORAL DAILY
Status: DISCONTINUED | OUTPATIENT
Start: 2017-01-01 | End: 2017-01-01

## 2017-01-01 RX ORDER — LEVOFLOXACIN 5 MG/ML
750 INJECTION, SOLUTION INTRAVENOUS
Status: DISCONTINUED | OUTPATIENT
Start: 2017-01-01 | End: 2017-01-01

## 2017-01-01 RX ORDER — HYDROXYZINE 25 MG/1
25 TABLET, FILM COATED ORAL
COMMUNITY
End: 2018-01-01

## 2017-01-01 RX ORDER — ADHESIVE BANDAGE
30 BANDAGE TOPICAL
Status: DISCONTINUED | OUTPATIENT
Start: 2017-01-01 | End: 2017-01-01 | Stop reason: HOSPADM

## 2017-01-01 RX ORDER — LEVOFLOXACIN 5 MG/ML
750 INJECTION, SOLUTION INTRAVENOUS
Status: COMPLETED | OUTPATIENT
Start: 2017-01-01 | End: 2017-01-01

## 2017-01-01 RX ORDER — SODIUM CHLORIDE 0.9 % (FLUSH) 0.9 %
5-10 SYRINGE (ML) INJECTION AS NEEDED
Status: DISCONTINUED | OUTPATIENT
Start: 2017-01-01 | End: 2017-01-01 | Stop reason: HOSPADM

## 2017-01-01 RX ORDER — ACETAMINOPHEN 325 MG/1
650 TABLET ORAL
COMMUNITY
End: 2018-01-01

## 2017-01-01 RX ORDER — MORPHINE SULFATE 20 MG/ML
5 SOLUTION ORAL
Status: ON HOLD | COMMUNITY
End: 2017-01-01

## 2017-01-01 RX ORDER — ENOXAPARIN SODIUM 100 MG/ML
40 INJECTION SUBCUTANEOUS EVERY 24 HOURS
Status: DISCONTINUED | OUTPATIENT
Start: 2017-01-01 | End: 2017-01-01 | Stop reason: HOSPADM

## 2017-01-01 RX ORDER — LEVOFLOXACIN 750 MG/1
750 TABLET ORAL DAILY
Qty: 5 TAB | Refills: 0 | Status: SHIPPED | OUTPATIENT
Start: 2017-01-01 | End: 2017-01-01

## 2017-01-01 RX ORDER — LANOLIN ALCOHOL/MO/W.PET/CERES
3 CREAM (GRAM) TOPICAL
COMMUNITY

## 2017-01-01 RX ORDER — ATROPINE SULFATE 10 MG/ML
2 SOLUTION/ DROPS OPHTHALMIC
COMMUNITY
End: 2017-01-01

## 2017-01-01 RX ORDER — LORAZEPAM 2 MG/ML
1 CONCENTRATE ORAL
Status: ON HOLD | COMMUNITY
End: 2017-01-01

## 2017-01-01 RX ORDER — SODIUM CHLORIDE 9 MG/ML
125 INJECTION, SOLUTION INTRAVENOUS CONTINUOUS
Status: DISCONTINUED | OUTPATIENT
Start: 2017-01-01 | End: 2017-01-01

## 2017-01-01 RX ORDER — ENOXAPARIN SODIUM 100 MG/ML
30 INJECTION SUBCUTANEOUS EVERY 24 HOURS
Status: DISCONTINUED | OUTPATIENT
Start: 2017-01-01 | End: 2017-01-01 | Stop reason: HOSPADM

## 2017-01-01 RX ORDER — LEVOFLOXACIN 5 MG/ML
750 INJECTION, SOLUTION INTRAVENOUS
Status: DISCONTINUED | OUTPATIENT
Start: 2017-01-01 | End: 2017-01-01 | Stop reason: HOSPADM

## 2017-01-01 RX ORDER — DEXTROSE MONOHYDRATE AND SODIUM CHLORIDE 5; .45 G/100ML; G/100ML
50 INJECTION, SOLUTION INTRAVENOUS CONTINUOUS
Status: DISCONTINUED | OUTPATIENT
Start: 2017-01-01 | End: 2017-01-01

## 2017-01-01 RX ORDER — LORAZEPAM 2 MG/ML
1 CONCENTRATE ORAL
Qty: 30 ML | Refills: 0 | Status: SHIPPED | OUTPATIENT
Start: 2017-01-01

## 2017-01-01 RX ORDER — RISPERIDONE 0.25 MG/1
0.25 TABLET, FILM COATED ORAL 2 TIMES DAILY
Status: DISCONTINUED | OUTPATIENT
Start: 2017-01-01 | End: 2017-01-01 | Stop reason: HOSPADM

## 2017-01-01 RX ORDER — DONEPEZIL HYDROCHLORIDE 23 MG/1
23 TABLET, FILM COATED ORAL
Status: DISCONTINUED | OUTPATIENT
Start: 2017-01-01 | End: 2017-01-01

## 2017-01-01 RX ORDER — ACETAMINOPHEN 325 MG/1
650 TABLET ORAL
Status: DISCONTINUED | OUTPATIENT
Start: 2017-01-01 | End: 2017-01-01 | Stop reason: HOSPADM

## 2017-01-01 RX ORDER — DEXTROSE MONOHYDRATE AND SODIUM CHLORIDE 5; .45 G/100ML; G/100ML
75 INJECTION, SOLUTION INTRAVENOUS CONTINUOUS
Status: DISCONTINUED | OUTPATIENT
Start: 2017-01-01 | End: 2017-01-01 | Stop reason: HOSPADM

## 2017-01-01 RX ORDER — LANOLIN ALCOHOL/MO/W.PET/CERES
1 CREAM (GRAM) TOPICAL
Status: DISCONTINUED | OUTPATIENT
Start: 2017-01-01 | End: 2017-01-01

## 2017-01-01 RX ADMIN — ATORVASTATIN CALCIUM 20 MG: 20 TABLET, FILM COATED ORAL at 21:24

## 2017-01-01 RX ADMIN — MELATONIN TAB 3 MG 3 MG: 3 TAB at 22:45

## 2017-01-01 RX ADMIN — AZTREONAM 2 G: 2 INJECTION, POWDER, LYOPHILIZED, FOR SOLUTION INTRAMUSCULAR; INTRAVENOUS at 08:33

## 2017-01-01 RX ADMIN — LACTULOSE 20 G: 20 SOLUTION ORAL at 09:28

## 2017-01-01 RX ADMIN — RISPERIDONE 0.25 MG: 0.25 TABLET, FILM COATED ORAL at 17:44

## 2017-01-01 RX ADMIN — RISPERIDONE 0.25 MG: 0.25 TABLET, FILM COATED ORAL at 17:59

## 2017-01-01 RX ADMIN — DEXTROSE MONOHYDRATE AND SODIUM CHLORIDE 50 ML/HR: 5; .45 INJECTION, SOLUTION INTRAVENOUS at 20:34

## 2017-01-01 RX ADMIN — ENOXAPARIN SODIUM 30 MG: 30 INJECTION SUBCUTANEOUS at 19:03

## 2017-01-01 RX ADMIN — SODIUM CHLORIDE 750 MG: 900 INJECTION, SOLUTION INTRAVENOUS at 01:00

## 2017-01-01 RX ADMIN — DEXTROSE MONOHYDRATE AND SODIUM CHLORIDE 50 ML/HR: 5; .45 INJECTION, SOLUTION INTRAVENOUS at 15:35

## 2017-01-01 RX ADMIN — SODIUM CHLORIDE 750 MG: 900 INJECTION, SOLUTION INTRAVENOUS at 14:53

## 2017-01-01 RX ADMIN — DEXTROSE MONOHYDRATE AND SODIUM CHLORIDE 75 ML/HR: 5; .45 INJECTION, SOLUTION INTRAVENOUS at 01:59

## 2017-01-01 RX ADMIN — SODIUM CHLORIDE 1250 MG: 900 INJECTION, SOLUTION INTRAVENOUS at 16:03

## 2017-01-01 RX ADMIN — DEXTROSE MONOHYDRATE AND SODIUM CHLORIDE 50 ML/HR: 5; .45 INJECTION, SOLUTION INTRAVENOUS at 03:00

## 2017-01-01 RX ADMIN — SODIUM CHLORIDE 750 MG: 900 INJECTION, SOLUTION INTRAVENOUS at 15:35

## 2017-01-01 RX ADMIN — SODIUM CHLORIDE 750 MG: 900 INJECTION, SOLUTION INTRAVENOUS at 16:40

## 2017-01-01 RX ADMIN — RISPERIDONE 0.25 MG: 0.25 TABLET, FILM COATED ORAL at 10:00

## 2017-01-01 RX ADMIN — RISPERIDONE 0.25 MG: 0.25 TABLET, FILM COATED ORAL at 18:26

## 2017-01-01 RX ADMIN — LACTULOSE 20 G: 20 SOLUTION ORAL at 09:17

## 2017-01-01 RX ADMIN — RANITIDINE HYDROCHLORIDE 150 MG: 150 TABLET, FILM COATED ORAL at 09:17

## 2017-01-01 RX ADMIN — AZTREONAM 2 G: 2 INJECTION, POWDER, LYOPHILIZED, FOR SOLUTION INTRAMUSCULAR; INTRAVENOUS at 09:58

## 2017-01-01 RX ADMIN — AZTREONAM 2 G: 2 INJECTION, POWDER, LYOPHILIZED, FOR SOLUTION INTRAMUSCULAR; INTRAVENOUS at 15:26

## 2017-01-01 RX ADMIN — DEXTROSE MONOHYDRATE AND SODIUM CHLORIDE 50 ML/HR: 5; .45 INJECTION, SOLUTION INTRAVENOUS at 04:38

## 2017-01-01 RX ADMIN — SODIUM CHLORIDE 1000 ML: 900 INJECTION, SOLUTION INTRAVENOUS at 23:37

## 2017-01-01 RX ADMIN — MELATONIN TAB 3 MG 3 MG: 3 TAB at 22:29

## 2017-01-01 RX ADMIN — ENOXAPARIN SODIUM 30 MG: 30 INJECTION SUBCUTANEOUS at 20:35

## 2017-01-01 RX ADMIN — RISPERIDONE 0.25 MG: 0.25 TABLET, FILM COATED ORAL at 17:40

## 2017-01-01 RX ADMIN — LACTULOSE 20 G: 20 SOLUTION ORAL at 18:05

## 2017-01-01 RX ADMIN — RANITIDINE HYDROCHLORIDE 150 MG: 150 TABLET, FILM COATED ORAL at 13:06

## 2017-01-01 RX ADMIN — FERROUS SULFATE TAB 325 MG (65 MG ELEMENTAL FE) 325 MG: 325 (65 FE) TAB at 13:06

## 2017-01-01 RX ADMIN — RANITIDINE HYDROCHLORIDE 150 MG: 150 TABLET, FILM COATED ORAL at 09:26

## 2017-01-01 RX ADMIN — LEVOFLOXACIN 750 MG: 5 INJECTION, SOLUTION INTRAVENOUS at 20:35

## 2017-01-01 RX ADMIN — ESCITALOPRAM OXALATE 10 MG: 10 TABLET ORAL at 10:00

## 2017-01-01 RX ADMIN — AZTREONAM 2 G: 2 INJECTION, POWDER, LYOPHILIZED, FOR SOLUTION INTRAMUSCULAR; INTRAVENOUS at 00:10

## 2017-01-01 RX ADMIN — LEVOFLOXACIN 750 MG: 750 INJECTION, SOLUTION INTRAVENOUS at 23:41

## 2017-01-01 RX ADMIN — RANITIDINE HYDROCHLORIDE 150 MG: 150 TABLET, FILM COATED ORAL at 10:00

## 2017-01-01 RX ADMIN — SODIUM CHLORIDE 500 MG: 900 INJECTION, SOLUTION INTRAVENOUS at 05:26

## 2017-01-01 RX ADMIN — IOPAMIDOL 95 ML: 612 INJECTION, SOLUTION INTRAVENOUS at 11:59

## 2017-01-01 RX ADMIN — LACTULOSE 20 G: 20 SOLUTION ORAL at 18:26

## 2017-01-01 RX ADMIN — ESCITALOPRAM OXALATE 10 MG: 10 TABLET ORAL at 13:06

## 2017-01-01 RX ADMIN — ENOXAPARIN SODIUM 40 MG: 40 INJECTION SUBCUTANEOUS at 03:56

## 2017-01-01 RX ADMIN — ESCITALOPRAM OXALATE 10 MG: 10 TABLET ORAL at 09:26

## 2017-01-01 RX ADMIN — SODIUM CHLORIDE 500 MG: 900 INJECTION, SOLUTION INTRAVENOUS at 08:31

## 2017-01-01 RX ADMIN — RISPERIDONE 0.25 MG: 0.25 TABLET, FILM COATED ORAL at 18:04

## 2017-01-01 RX ADMIN — ESCITALOPRAM OXALATE 10 MG: 10 TABLET ORAL at 09:17

## 2017-01-01 RX ADMIN — SODIUM CHLORIDE 750 MG: 900 INJECTION, SOLUTION INTRAVENOUS at 06:20

## 2017-01-01 RX ADMIN — SODIUM CHLORIDE 1000 ML: 900 INJECTION, SOLUTION INTRAVENOUS at 13:47

## 2017-01-01 RX ADMIN — AZTREONAM 2 G: 2 INJECTION, POWDER, LYOPHILIZED, FOR SOLUTION INTRAMUSCULAR; INTRAVENOUS at 00:24

## 2017-01-01 RX ADMIN — DEXTROSE MONOHYDRATE AND SODIUM CHLORIDE 50 ML/HR: 5; .45 INJECTION, SOLUTION INTRAVENOUS at 12:01

## 2017-01-01 RX ADMIN — SODIUM CHLORIDE 500 MG: 900 INJECTION, SOLUTION INTRAVENOUS at 18:26

## 2017-01-01 RX ADMIN — ENOXAPARIN SODIUM 30 MG: 30 INJECTION SUBCUTANEOUS at 18:29

## 2017-01-01 RX ADMIN — MELATONIN TAB 3 MG 3 MG: 3 TAB at 21:24

## 2017-01-01 RX ADMIN — DEXTROSE MONOHYDRATE AND SODIUM CHLORIDE 75 ML/HR: 5; .45 INJECTION, SOLUTION INTRAVENOUS at 18:31

## 2017-01-01 RX ADMIN — LACTULOSE 20 G: 20 SOLUTION ORAL at 17:59

## 2017-01-01 RX ADMIN — SODIUM CHLORIDE 600 MG: 900 INJECTION, SOLUTION INTRAVENOUS at 14:51

## 2017-01-01 RX ADMIN — ENOXAPARIN SODIUM 30 MG: 30 INJECTION SUBCUTANEOUS at 18:06

## 2017-01-01 RX ADMIN — SODIUM CHLORIDE 1000 MG: 900 INJECTION, SOLUTION INTRAVENOUS at 00:00

## 2017-01-01 RX ADMIN — RISPERIDONE 0.25 MG: 0.25 TABLET, FILM COATED ORAL at 09:26

## 2017-01-01 RX ADMIN — DEXTROSE MONOHYDRATE AND SODIUM CHLORIDE 50 ML/HR: 5; .45 INJECTION, SOLUTION INTRAVENOUS at 07:03

## 2017-01-01 RX ADMIN — SODIUM CHLORIDE 500 ML: 900 INJECTION, SOLUTION INTRAVENOUS at 00:25

## 2017-01-01 RX ADMIN — RISPERIDONE 0.25 MG: 0.25 TABLET, FILM COATED ORAL at 09:17

## 2017-01-01 RX ADMIN — LACTULOSE 20 G: 20 SOLUTION ORAL at 17:44

## 2017-01-01 RX ADMIN — SODIUM CHLORIDE 125 ML/HR: 900 INJECTION, SOLUTION INTRAVENOUS at 14:50

## 2017-01-01 RX ADMIN — SODIUM CHLORIDE 500 ML: 900 INJECTION, SOLUTION INTRAVENOUS at 14:50

## 2017-01-01 RX ADMIN — SODIUM CHLORIDE 500 MG: 900 INJECTION, SOLUTION INTRAVENOUS at 17:47

## 2017-01-01 RX ADMIN — ENOXAPARIN SODIUM 30 MG: 30 INJECTION SUBCUTANEOUS at 19:53

## 2017-01-01 RX ADMIN — AZTREONAM 2 G: 2 INJECTION, POWDER, LYOPHILIZED, FOR SOLUTION INTRAMUSCULAR; INTRAVENOUS at 15:42

## 2017-01-01 RX ADMIN — LACTULOSE 20 G: 20 SOLUTION ORAL at 10:00

## 2017-01-01 RX ADMIN — SODIUM CHLORIDE 750 MG: 900 INJECTION, SOLUTION INTRAVENOUS at 17:49

## 2017-01-01 RX ADMIN — ENOXAPARIN SODIUM 30 MG: 30 INJECTION SUBCUTANEOUS at 18:26

## 2017-01-01 RX ADMIN — FERROUS SULFATE TAB 325 MG (65 MG ELEMENTAL FE) 325 MG: 325 (65 FE) TAB at 10:00

## 2017-01-01 RX ADMIN — ENOXAPARIN SODIUM 40 MG: 40 INJECTION SUBCUTANEOUS at 04:28

## 2017-06-18 NOTE — ED PROVIDER NOTES
HPI Comments:   9:06 AM   81 y.o. female presents to ED C/O N/V/D. Patient has a HX of Dementia HTN, alcoholism. Patient brought in via EMS, per niece patient started having N/V/D late last night. Per [facility] staff 4 episodes of vomiting since midnight and 1 large loose stool this morning. Niece reports patient's behavior is at baseline, appears in no distress. Niece reports feeding patient new dinner last night, cod fish and cream sauce, never served at nursing home before. Pt denies any other sxs or complaints. Written by Akanksha DELEON      The history is provided by the patient and a relative. The history is limited by the condition of the patient. Past Medical History:   Diagnosis Date    Alcoholism [facility])     Alzheimer's dementia     CAD (coronary artery disease)     COPD     DEMENTIA     High cholesterol     Hyperammonemia ([facility] [address]     Hypertension        History reviewed. No pertinent surgical history. History reviewed. No pertinent family history. Social History     Social History    Marital status:      Spouse name: N/A    Number of children: N/A    Years of education: N/A     Occupational History    Not on file. Social History Main Topics    Smoking status: Former Smoker    Smokeless tobacco: Not on file    Alcohol use Yes    Drug use: Not on file    Sexual activity: Not on file     Other Topics Concern    Not on file     Social History Narrative         ALLERGIES: Cephalexin and Skelaxin [metaxalone]    Review of Systems   Unable to perform ROS: Dementia       Vitals:    06/18/17 1230 06/18/17 1245 06/18/17 1300 06/18/17 1315   BP: 118/59 124/57 117/55 111/57   Pulse:       Resp:       Temp:       SpO2: 97% 97% 98% 99%   Weight:       Height:                Physical Exam   Constitutional:   Frail appearing    Cardiovascular: Normal rate and regular rhythm. Pulmonary/Chest: Effort normal and breath sounds normal. No respiratory distress.  She has no wheezes. She has no rales. Abdominal: Soft. Bowel sounds are normal. There is tenderness. There is guarding. Genitourinary: Rectal exam shows guaiac positive stool. Genitourinary Comments: Brown stool noted, however guaiac positive    Musculoskeletal: Normal range of motion. Neurological:   Will open eyes when spoke to, none verbal. Baseline per family. Skin: Skin is warm and dry. Nursing note and vitals reviewed. MDM  Number of Diagnoses or Management Options  Diarrhea, unspecified type:   Nausea and vomiting in adult:   Positive occult stool blood test:   Diagnosis management comments: Differential Diagnosis: appendicitis, constipation, gastroenteritis, IBS, IBD, celiac disease, diverticulitis, nephrolithiasis, pyelonephritis, cystitis, mesenteric ischemia, mesenteric adenitis, ruptured AAA, SBO, LBO, testicular torsion      Plan:  Due to nonverbal and guarding, CT abd ordered, CBC, CMO, EKG, cardiac panel, UA, chest xray  Progress - UA - no evidence of infection, cardiac panel is WNKL, H&H is slightly decreased at 103&33.1. New elevation of platelets at 944, shift noted, neutrophils are 94, mag is WNL  BUN is elevated at 27 and BUNCR is elevated at 31, bolus ordered, mag is WNL  12:15 PM Spoke to niece, reports patient does have a chronic issue with hemorrhoids. EKG - Sinus tachycardia Left anterior fascicular block Minimal voltage criteria for LVH, may be normal variant Abnormal ECG When compared with ECG of 17-DEC-2016 14:52, No significant change was found   CT Abd pelvis - Findings:    1. mildly prominent distension of the gall bladder. No wall thickening, pericholecystic fluid, or radiopaque stones. 2. diverticulosis without active inflammatory changes  3. chronic appearing, but indeterminate t12 compression deformity.   4. dextroscoliosis  5. L renal cyst, slightly higher than fluid density with septation, most commonly represents proteinacious material.   1:49 PM Patient has had no vomiting or diarrhea while in department. 2:00 PM discussed case with attending and agrees with plan to discharge home. Informed family of results and diagnosis. Patient is in no distress, vitals stable, no vomiting or loose stool while in department. Patient to be discharged home. Educated to follow-up with PCP for further evaluation. Family denies questions. Amount and/or Complexity of Data Reviewed  Clinical lab tests: ordered and reviewed  Tests in the radiology section of CPT®: ordered and reviewed      ED Course       Procedures        RESULTS:    CT ABD PELV W CONT   Final Result      XR CHEST PORT    (Results Pending)       Labs Reviewed   CBC WITH AUTOMATED DIFF - Abnormal; Notable for the following:        Result Value    RBC 3.91 (*)     HGB 10.1 (*)     HCT 33.1 (*)     MCHC 30.5 (*)     RDW 17.2 (*)     PLATELET 473 (*)     MPV 9.1 (*)     NEUTROPHILS 94 (*)     LYMPHOCYTES 4 (*)     MONOCYTES 2 (*)     ABS. NEUTROPHILS 9.4 (*)     ABS.  LYMPHOCYTES 0.4 (*)     All other components within normal limits   METABOLIC PANEL, COMPREHENSIVE - Abnormal; Notable for the following:     Glucose 126 (*)     BUN 27 (*)     BUN/Creatinine ratio 31 (*)     Albumin 3.1 (*)     Globulin 4.7 (*)     A-G Ratio 0.7 (*)     All other components within normal limits   MAGNESIUM   URINALYSIS W/ RFLX MICROSCOPIC   CARDIAC PANEL,(CK, CKMB & TROPONIN)       Recent Results (from the past 12 hour(s))   CBC WITH AUTOMATED DIFF    Collection Time: 06/18/17  9:25 AM   Result Value Ref Range    WBC 10.0 4.6 - 13.2 K/uL    RBC 3.91 (L) 4.20 - 5.30 M/uL    HGB 10.1 (L) 12.0 - 16.0 g/dL    HCT 33.1 (L) 35.0 - 45.0 %    MCV 84.7 74.0 - 97.0 FL    MCH 25.8 24.0 - 34.0 PG    MCHC 30.5 (L) 31.0 - 37.0 g/dL    RDW 17.2 (H) 11.6 - 14.5 %    PLATELET 302 (H) 254 - 420 K/uL    MPV 9.1 (L) 9.2 - 11.8 FL    NEUTROPHILS 94 (H) 40 - 73 %    LYMPHOCYTES 4 (L) 21 - 52 %    MONOCYTES 2 (L) 3 - 10 %    EOSINOPHILS 0 0 - 5 % BASOPHILS 0 0 - 2 %    ABS. NEUTROPHILS 9.4 (H) 1.8 - 8.0 K/UL    ABS. LYMPHOCYTES 0.4 (L) 0.9 - 3.6 K/UL    ABS. MONOCYTES 0.2 0.05 - 1.2 K/UL    ABS. EOSINOPHILS 0.0 0.0 - 0.4 K/UL    ABS. BASOPHILS 0.0 0.0 - 0.06 K/UL    DF AUTOMATED     METABOLIC PANEL, COMPREHENSIVE    Collection Time: 06/18/17  9:25 AM   Result Value Ref Range    Sodium 143 136 - 145 mmol/L    Potassium 4.1 3.5 - 5.5 mmol/L    Chloride 106 100 - 108 mmol/L    CO2 26 21 - 32 mmol/L    Anion gap 11 3.0 - 18 mmol/L    Glucose 126 (H) 74 - 99 mg/dL    BUN 27 (H) 7.0 - 18 MG/DL    Creatinine 0.86 0.6 - 1.3 MG/DL    BUN/Creatinine ratio 31 (H) 12 - 20      GFR est AA >60 >60 ml/min/1.73m2    GFR est non-AA >60 >60 ml/min/1.73m2    Calcium 9.5 8.5 - 10.1 MG/DL    Bilirubin, total 0.3 0.2 - 1.0 MG/DL    ALT (SGPT) 26 13 - 56 U/L    AST (SGOT) 28 15 - 37 U/L    Alk.  phosphatase 115 45 - 117 U/L    Protein, total 7.8 6.4 - 8.2 g/dL    Albumin 3.1 (L) 3.4 - 5.0 g/dL    Globulin 4.7 (H) 2.0 - 4.0 g/dL    A-G Ratio 0.7 (L) 0.8 - 1.7     MAGNESIUM    Collection Time: 06/18/17  9:25 AM   Result Value Ref Range    Magnesium 2.0 1.6 - 2.6 mg/dL   CARDIAC PANEL,(CK, CKMB & TROPONIN)    Collection Time: 06/18/17  9:45 AM   Result Value Ref Range    CK 56 26 - 192 U/L    CK - MB <1.0 <3.6 ng/ml    CK-MB Index Cannot be calulated 0.0 - 4.0 %    Troponin-I, Qt. <0.02 0.0 - 0.045 NG/ML   EKG, 12 LEAD, INITIAL    Collection Time: 06/18/17 10:51 AM   Result Value Ref Range    Ventricular Rate 107 BPM    Atrial Rate 107 BPM    P-R Interval 142 ms    QRS Duration 86 ms    Q-T Interval 328 ms    QTC Calculation (Bezet) 437 ms    Calculated P Axis 31 degrees    Calculated R Axis -50 degrees    Calculated T Axis 33 degrees    Diagnosis       Sinus tachycardia  Left anterior fascicular block  Minimal voltage criteria for LVH, may be normal variant  Abnormal ECG  When compared with ECG of 17-DEC-2016 14:52,  No significant change was found     URINALYSIS W/ RFLX MICROSCOPIC Collection Time: 06/18/17 11:15 AM   Result Value Ref Range    Color YELLOW      Appearance CLEAR      Specific gravity 1.023 1.005 - 1.030      pH (UA) 5.0 5.0 - 8.0      Protein NEGATIVE  NEG mg/dL    Glucose NEGATIVE  NEG mg/dL    Ketone NEGATIVE  NEG mg/dL    Bilirubin NEGATIVE  NEG      Blood NEGATIVE  NEG      Urobilinogen 0.2 0.2 - 1.0 EU/dL    Nitrites NEGATIVE  NEG      Leukocyte Esterase NEGATIVE  NEG         PROGRESS NOTE:   9:07 AM   Initial assessment completed. Written by Edgar Stone NP-C     DISCHARGE NOTE:  2:08 PM   Lissa Feldman's  results have been reviewed with her daughter. She has been counseled regarding her aunts diagnosis, treatment, and plan. She verbally conveys understanding and agreement of the signs, symptoms, diagnosis, treatment and prognosis and additionally agrees to follow up as discussed. She also agrees with the care-plan and conveys that all of her questions have been answered. I have also provided discharge instructions for her that include: educational information regarding their diagnosis and treatment, and list of reasons why they would want to return to the ED prior to their follow-up appointment, should her condition change. CLINICAL IMPRESSION:    1. Nausea and vomiting in adult    2. Diarrhea, unspecified type    3. Positive occult stool blood test        AFTER VISIT PLAN:    Current Discharge Medication List      START taking these medications    Details   ondansetron (ZOFRAN ODT) 4 mg disintegrating tablet Take 1 Tab by mouth every eight (8) hours as needed for Nausea.   Qty: 10 Tab, Refills: 0              Follow-up Information     Follow up With Details Comments Contact Info    Tiffanie Rosales MD Schedule an appointment as soon as possible for a visit in 3 days Further evaluation [address]  [phone]             Written by Edgar DELAROSAC

## 2017-06-18 NOTE — ED NOTES
Medical transport here to take patient to [facility] Discharge instructions given to daughter.

## 2017-06-18 NOTE — ED TRIAGE NOTES
End stage alzheimers patient from [place] ,presents with vomiting and diarrhea. Explosive per nursing home.  Niece in [address]

## 2017-08-19 NOTE — IP AVS SNAPSHOT
Dar Brown 
 
 
 [address] [place] [address] 
[phone] Patient: Les Gonzalez MRN: [id number] Current Discharge Medication List  
  
START taking these medications Dose & Instructions Dispensing Information Comments Morning Noon Evening Bedtime  
 levoFLOXacin 750 mg tablet Commonly known as:  Ary Danes Your last dose was: Your next dose is:    
   
   
 Dose:  750 mg Take 1 Tab by mouth daily. Quantity:  5 Tab Refills:  0 CONTINUE these medications which have CHANGED Dose & Instructions Dispensing Information Comments Morning Noon Evening Bedtime LORazepam 2 mg/mL concentrated solution Commonly known as:  INTENSOL What changed:  Another medication with the same name was removed. Continue taking this medication, and follow the directions you see here. Your last dose was: Your next dose is:    
   
   
 Dose:  1 mg Take 0.5 mL by mouth every four (4) hours as needed for Anxiety. Max Daily Amount: 6 mg. Quantity:  30 mL Refills:  0 CONTINUE these medications which have NOT CHANGED Dose & Instructions Dispensing Information Comments Morning Noon Evening Bedtime ALLEGRA 180 mg tablet Generic drug:  fexofenadine Your last dose was: Your next dose is:    
   
   
 Dose:  60 mg Take 60 mg by mouth daily. Refills:  0  
     
   
   
   
  
 ARICEPT 23 mg film coated tablet Generic drug:  donepezil Your last dose was: Your next dose is:    
   
   
 Dose:  23 mg Take 23 mg by mouth nightly. Refills:  0  
     
   
   
   
  
 atorvastatin 20 mg tablet Commonly known as:  LIPITOR Your last dose was: Your next dose is:    
   
   
 Dose:  20 mg Take 20 mg by mouth daily. Refills:  0  
     
   
   
   
  
 atropine 1 % ophthalmic solution Your last dose was: Your next dose is:    
   
   
 Dose:  2 Drop  
2 Drops by SubLINGual route every two (2) hours as needed (to dry secretions). Refills:  0 CeleBREX 200 mg capsule Generic drug:  celecoxib Your last dose was: Your next dose is:    
   
   
 Dose:  200 mg Take 200 mg by mouth two (2) times a day. Refills:  0  
     
   
   
   
  
 escitalopram oxalate 10 mg tablet Commonly known as:  Jin Aas Your last dose was: Your next dose is:    
   
   
 Dose:  10 mg Take 10 mg by mouth daily. Refills:  0  
     
   
   
   
  
 ferrous sulfate 325 mg (65 mg iron) tablet Your last dose was: Your next dose is: Take  by mouth Daily (before breakfast). Refills:  0 HALDOL INJECTION Your last dose was: Your next dose is:    
   
   
 Dose:  1 mg Take 1 mg by mouth every four (4) hours as needed (agitation). Refills:  0  
     
   
   
   
  
 hydrOXYzine HCl 25 mg tablet Commonly known as:  ATARAX Your last dose was: Your next dose is:    
   
   
 Dose:  25 mg Take 25 mg by mouth every six (6) hours as needed for Itching. Refills:  0  
     
   
   
   
  
 lactulose 10 gram/15 mL solution Commonly known as:  Tracey Eves Your last dose was: Your next dose is:    
   
   
 Dose:  20 g Take 20 g by mouth two (2) times a day. Refills:  0  
     
   
   
   
  
 loperamide 2 mg capsule Commonly known as:  IMODIUM Your last dose was: Your next dose is:    
   
   
 Dose:  2 mg Take 2 mg by mouth every eight (8) hours as needed. Refills:  0 MAALOX ADVANCED 200-200-20 mg/5 mL Susp Generic drug:  alum-mag hydroxide-simeth Your last dose was: Your next dose is:    
   
   
 Dose:  30 mL Take 30 mL by mouth four (4) times daily as needed. Refills:  0 melatonin 3 mg tablet Your last dose was: Your next dose is:    
   
   
 Dose:  3 mg Take 3 mg by mouth nightly. Refills:  0  
     
   
   
   
  
 morphine 100 mg/5 mL (20 mg/mL) concentrated solution Commonly known as:  Schwab Manual Your last dose was: Your next dose is:    
   
   
 Dose:  5 mg  
0.25 mL by SubLINGual route every four (4) hours as needed for Pain. Max Daily Amount: 30 mg.  
 Quantity:  30 mL Refills:  0  
     
   
   
   
  
 NAMENDA XR 28 mg capsule Generic drug:  memantine ER Your last dose was: Your next dose is:    
   
   
 Dose:  28 mg Take 28 mg by mouth daily. Refills:  0  
     
   
   
   
  
 ondansetron 4 mg disintegrating tablet Commonly known as:  ZOFRAN ODT Your last dose was: Your next dose is:    
   
   
 Dose:  4 mg Take 1 Tab by mouth every eight (8) hours as needed for Nausea. Quantity:  10 Tab Refills:  0 MENEZES MILK OF MAGNESIA 400 mg/5 mL suspension Generic drug:  magnesium hydroxide Your last dose was: Your next dose is:    
   
   
 Dose:  30 mL Take 30 mL by mouth every four (4) hours as needed for Constipation. Refills:  0 PREMARIN 0.625 mg tablet Generic drug:  conjugated estrogens Your last dose was: Your next dose is:    
   
   
 Dose:  0.625 mg Take 0.625 mg by mouth. Refills:  0 PREMPRO 0.625-2.5 mg per tablet Generic drug:  estrogen (conjugated)-medroxyPROGESTERone Your last dose was: Your next dose is:    
   
   
 Dose:  1 Tab Take 1 Tab by mouth daily. Refills:  0 PRO-STAT AWC  gram-kcal/30 mL Liqd Generic drug:  amino acids-protein hydrolys Your last dose was: Your next dose is:    
   
   
 Dose:  30 mL Take 30 mL by mouth daily. Refills:  0 QUEtiapine 25 mg tablet Commonly known as:  SEROquel Your last dose was: Your next dose is:    
   
   
 Dose:  12.5 mg Take 12.5 mg by mouth nightly. Refills:  0 REMERON 15 mg tablet Generic drug:  mirtazapine Your last dose was: Your next dose is:    
   
   
 Dose:  7.5 mg Take 7.5 mg by mouth nightly. Refills:  0 RisperDAL 0.25 mg tablet Generic drug:  risperiDONE Your last dose was: Your next dose is:    
   
   
 Dose:  0.25 mg Take 0.25 mg by mouth two (2) times a day. Refills:  0  
     
   
   
   
  
 ROBAFEN 100 mg/5 mL liquid Generic drug:  guaiFENesin Your last dose was: Your next dose is:    
   
   
 Dose:  300 mg Take 300 mg by mouth every four (4) hours as needed for Cough. Refills:  0 SENNA LAXATIVE-STOOL SOFTENER 8.6-50 mg per tablet Generic drug:  senna-docusate Your last dose was: Your next dose is:    
   
   
 Dose:  1 Tab Take 1 Tab by mouth two (2) times a day. Refills:  0  
     
   
   
   
  
 TYLENOL 325 mg tablet Generic drug:  acetaminophen Your last dose was: Your next dose is:    
   
   
 Dose:  650 mg Take 650 mg by mouth every four (4) hours as needed for Pain or Fever. Refills:  0  
     
   
   
   
  
 VITAMIN D2 50,000 unit capsule Generic drug:  ergocalciferol Your last dose was: Your next dose is:    
   
   
 Dose:  60075 Units Take 50,000 Units by mouth daily. Refills:  0  
     
   
   
   
  
 ZANTAC 150 mg tablet Generic drug:  raNITIdine Your last dose was: Your next dose is:    
   
   
 Dose:  150 mg Take 150 mg by mouth daily. Refills:  0 Where to Get Your Medications Information on where to get these meds will be given to you by the nurse or doctor. ! Ask your nurse or doctor about these medications levoFLOXacin 750 mg tablet LORazepam 2 mg/mL concentrated solution  
 morphine 100 mg/5 mL (20 mg/mL) concentrated solution

## 2017-08-19 NOTE — Clinical Note
Status[de-identified] Inpatient [101] Type of Bed: Telemetry [19] Inpatient Hospitalization Certified Necessary for the Following Reasons: 3. Patient receiving treatment that can only be provided in an inpatient setting (further clarification in H&P documentation) Admitting Diagnosis: HCAP (healthcare-associated pneumonia) [4346118] Admitting Diagnosis: Encephalopathy [555769] Admitting Diagnosis: Dehydration [276.51. ICD-9-CM] Admitting Physician: Servando Brock Attending Physician: Servando Brock Estimated Length of Stay: 2 Midnights Discharge Plan[de-identified] Home with Office Follow-up

## 2017-08-19 NOTE — IP AVS SNAPSHOT
[address] [address] 
[phone] Patient: Kathleen Reilly MRN: [id number]:[id number] You are allergic to the following Allergen Reactions Cephalexin Hives Skelaxin (Metaxalone) Unknown (comments) Recent Documentation Height Weight BMI OB Status Smoking Status 1.575 m 45.4 kg 18.29 kg/m2 Postmenopausal Former Smoker Unresulted Labs Order Current Status CULTURE, URINE In process CULTURE, BLOOD Preliminary result CULTURE, BLOOD Preliminary result Emergency Contacts Name Discharge Info Relation Home Work Mobile Keren Oliveira 399 CAREGIVER [3] Daughter [21] [phone] Derik Feldman DISCHARGE CAREGIVER [3]    [phone] Christie Sinha     [phone] About your hospitalization You were admitted on:  August 20, 2017 You last received care in the:  [facility] [address] You were discharged on:  August 21, 2017 Unit phone number:  [phone] Why you were hospitalized Your primary diagnosis was:  Hcap (Healthcare-Associated Pneumonia) Your diagnoses also included:  Dehydration, Encephalopathy, Anemia, Dementia In Alzheimer's Disease, Thrombocytosis (Hcc), Dysphagia Providers Seen During Your Hospitalizations Provider Role Specialty Primary office phone Bard Zane MD Attending Provider Emergency Medicine [phone] Jamila Abrams MD Attending Provider [facility] [phone] Margoth Amor MD Attending Provider Internal Medicine [phone] Your Primary Care Physician (PCP) Primary Care Physician Office Phone Office Fax Saba Mix [phone] Follow-up Information Follow up With Details Comments [place]   [address] [id number] [phone] Current Discharge Medication List  
  
START taking these medications Dose & Instructions Dispensing Information Comments Morning Noon Evening Bedtime  
 levoFLOXacin 750 mg tablet Commonly known as:  Austyn Rings Your last dose was: Your next dose is:    
   
   
 Dose:  750 mg Take 1 Tab by mouth daily. Quantity:  5 Tab Refills:  0 CONTINUE these medications which have CHANGED Dose & Instructions Dispensing Information Comments Morning Noon Evening Bedtime LORazepam 2 mg/mL concentrated solution Commonly known as:  INTENSOL What changed:  Another medication with the same name was removed. Continue taking this medication, and follow the directions you see here. Your last dose was: Your next dose is:    
   
   
 Dose:  1 mg Take 0.5 mL by mouth every four (4) hours as needed for Anxiety. Max Daily Amount: 6 mg. Quantity:  30 mL Refills:  0 CONTINUE these medications which have NOT CHANGED Dose & Instructions Dispensing Information Comments Morning Noon Evening Bedtime ALLEGRA 180 mg tablet Generic drug:  fexofenadine Your last dose was: Your next dose is:    
   
   
 Dose:  60 mg Take 60 mg by mouth daily. Refills:  0  
     
   
   
   
  
 ARICEPT 23 mg film coated tablet Generic drug:  donepezil Your last dose was: Your next dose is:    
   
   
 Dose:  23 mg Take 23 mg by mouth nightly. Refills:  0  
     
   
   
   
  
 atorvastatin 20 mg tablet Commonly known as:  LIPITOR Your last dose was: Your next dose is:    
   
   
 Dose:  20 mg Take 20 mg by mouth daily. Refills:  0  
     
   
   
   
  
 atropine 1 % ophthalmic solution Your last dose was: Your next dose is:    
   
   
 Dose:  2 Drop  
2 Drops by SubLINGual route every two (2) hours as needed (to dry secretions). Refills:  0 CeleBREX 200 mg capsule Generic drug:  celecoxib Your last dose was: Your next dose is:    
   
   
 Dose:  200 mg Take 200 mg by mouth two (2) times a day. Refills:  0  
     
   
   
   
  
 escitalopram oxalate 10 mg tablet Commonly known as:  Daria Angst Your last dose was: Your next dose is:    
   
   
 Dose:  10 mg Take 10 mg by mouth daily. Refills:  0  
     
   
   
   
  
 ferrous sulfate 325 mg (65 mg iron) tablet Your last dose was: Your next dose is: Take  by mouth Daily (before breakfast). Refills:  0 HALDOL INJECTION Your last dose was: Your next dose is:    
   
   
 Dose:  1 mg Take 1 mg by mouth every four (4) hours as needed (agitation). Refills:  0  
     
   
   
   
  
 hydrOXYzine HCl 25 mg tablet Commonly known as:  ATARAX Your last dose was: Your next dose is:    
   
   
 Dose:  25 mg Take 25 mg by mouth every six (6) hours as needed for Itching. Refills:  0  
     
   
   
   
  
 lactulose 10 gram/15 mL solution Commonly known as:  Cherene Colla Your last dose was: Your next dose is:    
   
   
 Dose:  20 g Take 20 g by mouth two (2) times a day. Refills:  0  
     
   
   
   
  
 loperamide 2 mg capsule Commonly known as:  IMODIUM Your last dose was: Your next dose is:    
   
   
 Dose:  2 mg Take 2 mg by mouth every eight (8) hours as needed. Refills:  0 MAALOX ADVANCED 200-200-20 mg/5 mL Susp Generic drug:  alum-mag hydroxide-simeth Your last dose was: Your next dose is:    
   
   
 Dose:  30 mL Take 30 mL by mouth four (4) times daily as needed. Refills:  0  
     
   
   
   
  
 melatonin 3 mg tablet Your last dose was: Your next dose is:    
   
   
 Dose:  3 mg Take 3 mg by mouth nightly. Refills:  0 morphine 100 mg/5 mL (20 mg/mL) concentrated solution Commonly known as:  Анна Jovel Your last dose was: Your next dose is:    
   
   
 Dose:  5 mg  
0.25 mL by SubLINGual route every four (4) hours as needed for Pain. Max Daily Amount: 30 mg.  
 Quantity:  30 mL Refills:  0  
     
   
   
   
  
 NAMENDA XR 28 mg capsule Generic drug:  memantine ER Your last dose was: Your next dose is:    
   
   
 Dose:  28 mg Take 28 mg by mouth daily. Refills:  0  
     
   
   
   
  
 ondansetron 4 mg disintegrating tablet Commonly known as:  ZOFRAN ODT Your last dose was: Your next dose is:    
   
   
 Dose:  4 mg Take 1 Tab by mouth every eight (8) hours as needed for Nausea. Quantity:  10 Tab Refills:  0 MENEZES MILK OF MAGNESIA 400 mg/5 mL suspension Generic drug:  magnesium hydroxide Your last dose was: Your next dose is:    
   
   
 Dose:  30 mL Take 30 mL by mouth every four (4) hours as needed for Constipation. Refills:  0 PREMARIN 0.625 mg tablet Generic drug:  conjugated estrogens Your last dose was: Your next dose is:    
   
   
 Dose:  0.625 mg Take 0.625 mg by mouth. Refills:  0 PREMPRO 0.625-2.5 mg per tablet Generic drug:  estrogen (conjugated)-medroxyPROGESTERone Your last dose was: Your next dose is:    
   
   
 Dose:  1 Tab Take 1 Tab by mouth daily. Refills:  0 PRO-STAT AWC  gram-kcal/30 mL Liqd Generic drug:  amino acids-protein hydrolys Your last dose was: Your next dose is:    
   
   
 Dose:  30 mL Take 30 mL by mouth daily. Refills:  0 QUEtiapine 25 mg tablet Commonly known as:  SEROquel Your last dose was: Your next dose is:    
   
   
 Dose:  12.5 mg Take 12.5 mg by mouth nightly. Refills:  0 REMERON 15 mg tablet Generic drug:  mirtazapine Your last dose was: Your next dose is:    
   
   
 Dose:  7.5 mg Take 7.5 mg by mouth nightly. Refills:  0 RisperDAL 0.25 mg tablet Generic drug:  risperiDONE Your last dose was: Your next dose is:    
   
   
 Dose:  0.25 mg Take 0.25 mg by mouth two (2) times a day. Refills:  0  
     
   
   
   
  
 ROBAFEN 100 mg/5 mL liquid Generic drug:  guaiFENesin Your last dose was: Your next dose is:    
   
   
 Dose:  300 mg Take 300 mg by mouth every four (4) hours as needed for Cough. Refills:  0 SENNA LAXATIVE-STOOL SOFTENER 8.6-50 mg per tablet Generic drug:  senna-docusate Your last dose was: Your next dose is:    
   
   
 Dose:  1 Tab Take 1 Tab by mouth two (2) times a day. Refills:  0  
     
   
   
   
  
 TYLENOL 325 mg tablet Generic drug:  acetaminophen Your last dose was: Your next dose is:    
   
   
 Dose:  650 mg Take 650 mg by mouth every four (4) hours as needed for Pain or Fever. Refills:  0  
     
   
   
   
  
 VITAMIN D2 50,000 unit capsule Generic drug:  ergocalciferol Your last dose was: Your next dose is:    
   
   
 Dose:  56612 Units Take 50,000 Units by mouth daily. Refills:  0  
     
   
   
   
  
 ZANTAC 150 mg tablet Generic drug:  raNITIdine Your last dose was: Your next dose is:    
   
   
 Dose:  150 mg Take 150 mg by mouth daily. Refills:  0 Where to Get Your Medications Information on where to get these meds will be given to you by the nurse or doctor. ! Ask your nurse or doctor about these medications  
  levoFLOXacin 750 mg tablet LORazepam 2 mg/mL concentrated solution  
 morphine 100 mg/5 mL (20 mg/mL) concentrated solution Discharge Instructions Patient armband removed and shredded DISCHARGE SUMMARY from Nurse The following personal items are in your possession at time of discharge: 
 
Dental Appliances: None Visual Aid: At home Home Medications: None Jewelry: None Clothing: None Other Valuables: Other (comment) (pillow) PATIENT INSTRUCTIONS: 
 
 
F-face looks uneven A-arms unable to move or move unevenly S-speech slurred or non-existent T-time-call 911 as soon as signs and symptoms begin-DO NOT go Back to bed or wait to see if you get better-TIME IS BRAIN. Warning Signs of HEART ATTACK Call 911 if you have these symptoms: 
? Chest discomfort. Most heart attacks involve discomfort in the center of the chest that lasts more than a few minutes, or that goes away and comes back. It can feel like uncomfortable pressure, squeezing, fullness, or pain. ? Discomfort in other areas of the upper body. Symptoms can include pain or discomfort in one or both arms, the back, neck, jaw, or stomach. ? Shortness of breath with or without chest discomfort. ? Other signs may include breaking out in a cold sweat, nausea, or lightheadedness. Don't wait more than five minutes to call [address]! Fast action can save your life. Calling 911 is almost always the fastest way to get lifesaving treatment. Emergency Medical Services staff can begin treatment when they arrive  up to an hour sooner than if someone gets to the hospital by car. The discharge information has been reviewed with the patient and caregiver. The patient and caregiver verbalized understanding. Discharge medications reviewed with the patient and caregiver and appropriate educational materials and side effects teaching were provided. Discharge Instructions Attachments/References PNEUMONIA (ENGLISH) LEVOFLOXACIN (BY MOUTH) (ENGLISH) MEFS - MORPHINE, RAPID RELEASE (ROXANOL) - (BY MOUTH) (ENGLISH) LORAZEPAM (BY MOUTH) (ENGLISH) Discharge Orders None Introducing [facility]! Ricky Jefferson introduces [employer] patient portal. Now you can access parts of your medical record, email your doctor's office, and request medication refills online. 1. In your internet browser, go to [email or web address] 2. Click on the First Time User? Click Here link in the Sign In box. You will see the New Member Sign Up page. 3. Enter your [employer] Access Code exactly as it appears below. You will not need to use this code after youve completed the sign-up process. If you do not sign up before the expiration date, you must request a new code. · [employer] Access Code: [email or web address] Expires: 9/16/2017  2:07 PM 
 
4. Enter the last four digits of your Social Security Number (xxxx) and Date of Birth (mm/dd/yyyy) as indicated and click Submit. You will be taken to the next sign-up page. 5. Create a [employer] ID. This will be your [employer] login ID and cannot be changed, so think of one that is secure and easy to remember. 6. Create a [employer] password. You can change your password at any time. 7. Enter your Password Reset Question and Answer. This can be used at a later time if you forget your password. 8. Enter your e-mail address. You will receive e-mail notification when new information is available in [address] 9. Click Sign Up. You can now view and download portions of your medical record. 10. Click the Download Summary menu link to download a portable copy of your medical information.  
 
If you have questions, please visit the Frequently Asked Questions section of [facility]. Remember, MyChart is NOT to be used for urgent needs. For medical emergencies, dial 911. Now available from your iPhone and Android! General Information Please provide this summary of care documentation to your next provider. Patient Signature:  ____________________________________________________________ Date:  ____________________________________________________________  
  
Ravin Coronado Provider Signature:  ____________________________________________________________ Date:  ____________________________________________________________ More Information Pneumonia: Care Instructions Your Care Instructions Pneumonia is an infection of the lungs. Most cases are caused by infections from bacteria or viruses. Pneumonia may be mild or very severe. If it is caused by bacteria, you will be treated with antibiotics. It may take a few weeks to a few months to recover fully from pneumonia, depending on how sick you were and whether your overall health is good. Follow-up care is a key part of your treatment and safety. Be sure to make and go to all appointments, and call your doctor if you are having problems. Its also a good idea to know your test results and keep a list of the medicines you take. How can you care for yourself at home? · Take your antibiotics exactly as directed. Do not stop taking the medicine just because you are feeling better. You need to take the full course of antibiotics. · Take your medicines exactly as prescribed. Call your doctor if you think you are having a problem with your medicine. · Get plenty of rest and sleep. You may feel weak and tired for a while, but your energy level will improve with time. · To prevent dehydration, drink plenty of fluids, enough so that your urine is light yellow or clear like water.  Choose water and other caffeine-free clear liquids until you feel better. If you have kidney, heart, or liver disease and have to limit fluids, talk with your doctor before you increase the amount of fluids you drink. · Take care of your cough so you can rest. A cough that brings up mucus from your lungs is common with pneumonia. It is one way your body gets rid of the infection. But if coughing keeps you from resting or causes severe fatigue and chest-wall pain, talk to your doctor. He or she may suggest that you take a medicine to reduce the cough. · Use a vaporizer or humidifier to add moisture to your bedroom. Follow the directions for cleaning the machine. · Do not smoke or allow others to smoke around you. Smoke will make your cough last longer. If you need help quitting, talk to your doctor about stop-smoking programs and medicines. These can increase your chances of quitting for good. · Take an over-the-counter pain medicine, such as acetaminophen (Tylenol), ibuprofen (Advil, Motrin), or naproxen (Aleve). Read and follow all instructions on the label. · Do not take two or more pain medicines at the same time unless the doctor told you to. Many pain medicines have acetaminophen, which is Tylenol. Too much acetaminophen (Tylenol) can be harmful. · If you were given a spirometer to measure how well your lungs are working, use it as instructed. This can help your doctor tell how your recovery is going. · To prevent pneumonia in the future, talk to your doctor about getting a flu vaccine (once a year) and a pneumococcal vaccine (one time only for most people). When should you call for help? Call 911 anytime you think you may need emergency care. For example, call if: 
· You have severe trouble breathing. Call your doctor now or seek immediate medical care if: 
· You cough up dark brown or bloody mucus (sputum). · You have new or worse trouble breathing. · You are dizzy or lightheaded, or you feel like you may faint. Watch closely for changes in your health, and be sure to contact your doctor if: 
· You have a new or higher fever. · You are coughing more deeply or more often. · You are not getting better after 2 days (48 hours). · You do not get better as expected. Where can you learn more? Go to http://mahamed-marcela.info/. Enter [id number] in the search box to learn more about \"Pneumonia: Care Instructions. \" Current as of: March 25, 2017 Content Version: 11.3 © 8392-4451 [employer]. Care instructions adapted under license by [employer] (which disclaims liability or warranty for this information). If you have questions about a medical condition or this instruction, always ask your healthcare professional. [address] any warranty or liability for your use of this information. Levofloxacin (By mouth) Levofloxacin (omt-vrl-SJEK-a-sin) Treats infections. This medicine is a quinolone antibiotic. Brand Name(s): Levaquin There may be other brand names for this medicine. When This Medicine Should Not Be Used: This medicine is not right for everyone. Do not use it if you had an allergic reaction to levofloxacin or to similar medicines. How to Use This Medicine:  
Liquid, Tablet · Your doctor will tell you how much medicine to use. Do not use more than directed. Take your medicine at the same time each day. · Tablet: Take it with or without food. · Liquid: Take it 1 hour before or 2 hours after you eat. Measure the oral liquid medicine with a marked measuring spoon, oral syringe, or medicine cup. · Take all of the medicine in your prescription to clear up your infection, even if you feel better after the first few doses. · Drink extra fluids so you will urinate more often and help prevent kidney problems. · This medicine should come with a Medication Guide. Ask your pharmacist for a copy if you do not have one. · Missed dose: Take a dose as soon as you remember. If it is almost time for your next dose, wait until then and take a regular dose. Do not take extra medicine to make up for a missed dose. · Store the medicine in a closed container at room temperature, away from heat, moisture, and direct light. Drugs and Foods to Avoid: Ask your doctor or pharmacist before using any other medicine, including over-the-counter medicines, vitamins, and herbal products. · Some foods and medicines can affect how levofloxacin works. Tell your doctor if you are using any of the following: ¨ Theophylline ¨ Blood thinner (including warfarin) ¨ Diabetes medicine ¨ Medicine for heart rhythm problems (including amiodarone, procainamide, quinidine, sotalol) ¨ NSAID pain or arthritis medicine (including aspirin, celecoxib, diclofenac, ibuprofen, naproxen) ¨ Steroid medicine (including hydrocortisone, methylprednisolone, prednisone) · Take levofloxacin at least 2 hours before or 2 hours after you take antacids that contain magnesium or aluminum, zinc, or iron supplements, sucralfate, or didanosine. Warnings While Using This Medicine: · Tell your doctor if you are pregnant or breastfeeding, or if you have kidney disease, liver disease, diabetes, heart disease, myasthenia gravis, or a history of heart rhythm problems (such as QT prolongation) or seizures. Tell your doctor if you have ever had tendon or joint problems, including rheumatoid arthritis, or if you have received a transplant. · This medicine may cause the following problems: 
¨ Tendinitis and tendon rupture (may happen after treatment ends) ¨ Liver damage ¨ Nerve damage in the arms or legs ¨ Heart rhythm changes ¨ Changes in blood sugar levels · This medicine may make you feel dizzy or lightheaded. Do not drive or do anything else that could be dangerous until you know how this medicine affects you. · This medicine can cause diarrhea. Call your doctor if the diarrhea becomes severe, does not stop, or is bloody. Do not take any medicine to stop diarrhea until you have talked to your doctor. Diarrhea can occur 2 months or more after you stop taking this medicine. · Tell any doctor or dentist who treats you that you are using this medicine. This medicine may affect certain medical test results. · This medicine may make your skin more sensitive to sunlight. Wear sunscreen. Do not use sunlamps or tanning beds. · Call your doctor if your symptoms do not improve or if they get worse. · Keep all medicine out of the reach of children. Never share your medicine with anyone. Possible Side Effects While Using This Medicine:  
Call your doctor right away if you notice any of these side effects: · Allergic reaction: Itching or hives, swelling in your face or hands, swelling or tingling in your mouth or throat, chest tightness, trouble breathing · Blistering, peeling, red skin rash · Change in how much or how often you urinate · Dark urine or pale stools, nausea, vomiting, loss of appetite, stomach pain, yellow skin or eyes · Diarrhea that may contain blood · Fainting, dizziness, or lightheadedness · Fast, slow, or uneven heartbeat, chest pain · Numbness, tingling, or burning pain in your hands, arms, legs, or feet · Pain, stiffness, swelling, or bruises around your ankle, leg, shoulder, or other joint · Seizures, severe headache, unusual thoughts or behaviors, trouble sleeping, feeling anxious, confused, or depressed, seeing, hearing, or feeling things that are not there · Unusual bleeding, bruising, or weakness If you notice these less serious side effects, talk with your doctor: · Mild headache or nausea If you notice other side effects that you think are caused by this medicine, tell your doctor. Call your doctor for medical advice about side effects.  You may report side effects to FDA at 3-192-FDA-1088 © 2017 [address] Information is for End User's use only and may not be sold, redistributed or otherwise used for commercial purposes. The above information is an  only. It is not intended as medical advice for individual conditions or treatments. Talk to your doctor, nurse or pharmacist before following any medical regimen to see if it is safe and effective for you. Morphine, Rapid Release (Roxanol) - (By mouth) Why this medicine is used:  
Treats moderate to severe pain. This is a narcotic pain reliever. Contact a nurse or doctor right away if you have: 
· Extreme weakness, shallow breathing, slow heartbeat · Severe confusion, lightheadedness, dizziness, fainting · Sweating or cold, clammy skin · Severe constipation, stomach pain, vomiting Common side effects: · Mild constipation, nausea, vomiting · Sleepiness, tiredness · Itching, rash © 2017 [address] Information is for End User's use only and may not be sold, redistributed or otherwise used for commercial purposes. Lorazepam (By mouth) Lorazepam (gcn-AT-y-iliana) Treats anxiety. Brand Name(s): Ativan, LORazepam Intensol There may be other brand names for this medicine. When This Medicine Should Not Be Used: This medicine is not right for everyone. Do not use it if you had an allergic reaction to lorazepam or similar medicines, or you are pregnant or breastfeeding, or you have acute narrow-angle glaucoma. How to Use This Medicine:  
Liquid, Tablet · Take your medicine as directed. Your dose may need to be changed several times to find what works best for you. · Oral liquid: ¨ Measure the oral liquid medicine with a marked measuring spoon, oral syringe, or medicine cup. ¨ Mix the medicine with water, juice, soda, applesauce, or pudding. Drink or eat the mixture right away. Do not store it for later use. · This medicine should come with a Medication Guide. Ask your pharmacist for a copy if you do not have one. · Missed dose: Take a dose as soon as you remember. If you are more than 1 hour late, skip the missed dose and wait until it is time for your next dose. Do not use extra medicine to make up for a missed dose. ·  
¨ Oral liquid: Refrigerate the oral liquid. Throw away an opened bottle after 90 days. ¨ Tablets: Store the medicine in a closed container at room temperature, away from heat, moisture, and direct light. Drugs and Foods to Avoid: Ask your doctor or pharmacist before using any other medicine, including over-the-counter medicines, vitamins, and herbal products. · Some medicines can affect how lorazepam works. Tell your doctor if you are using any of the following: ¨ Aminophylline, clozapine, probenecid, theophylline, valproate ¨ Medicine to treat depression or mental health problems ¨ Medicine to treat seizures · Do not drink alcohol while you are using this medicine. · Tell your doctor if you use anything else that makes you sleepy. Some examples are allergy medicine, narcotic pain medicine, and alcohol. Warnings While Using This Medicine: · It is not safe to take this medicine during pregnancy. It could harm an unborn baby. Tell your doctor right away if you become pregnant. · Tell your doctor if you have kidney disease, liver disease, lung or breathing problems (such as COPD, sleep apnea), or a history of drug or alcohol abuse, depression, or seizures. · This medicine can be habit-forming. Do not use more than your prescribed dose. Call your doctor if you think your medicine is not working. · Do not stop using this medicine suddenly. Your doctor will need to slowly decrease your dose before you stop it completely. · This medicine may make you drowsy. Do not drive or do anything else that could be dangerous until you know how this medicine affects you. · Your doctor will do lab tests at regular visits to check on the effects of this medicine. Keep all appointments. · Keep all medicine out of the reach of children. Never share your medicine with anyone. Possible Side Effects While Using This Medicine:  
Call your doctor right away if you notice any of these side effects: · Allergic reaction: Itching or hives, swelling in your face or hands, swelling or tingling in your mouth or throat, chest tightness, trouble breathing · Confusion, unusual mood or behavior, thoughts of hurting yourself · Seizures · Severe drowsiness or weakness, slow heartbeat, trouble breathing · Worsening of depression If you notice these less serious side effects, talk with your doctor: · Dizziness, clumsiness If you notice other side effects that you think are caused by this medicine, tell your doctor. Call your doctor for medical advice about side effects. You may report side effects to FDA at 8-721-FDA-1266 © 2017 [address] Information is for End User's use only and may not be sold, redistributed or otherwise used for commercial purposes. The above information is an  only. It is not intended as medical advice for individual conditions or treatments. Talk to your doctor, nurse or pharmacist before following any medical regimen to see if it is safe and effective for you.

## 2017-08-20 PROBLEM — R13.10 DYSPHAGIA: Status: ACTIVE | Noted: 2017-01-01

## 2017-08-20 PROBLEM — J18.9 HCAP (HEALTHCARE-ASSOCIATED PNEUMONIA): Status: ACTIVE | Noted: 2017-01-01

## 2017-08-20 PROBLEM — D75.839 THROMBOCYTOSIS: Status: ACTIVE | Noted: 2017-01-01

## 2017-08-20 PROBLEM — D64.9 ANEMIA: Status: ACTIVE | Noted: 2017-01-01

## 2017-08-20 PROBLEM — F02.80 DEMENTIA IN ALZHEIMER'S DISEASE (HCC): Status: ACTIVE | Noted: 2017-01-01

## 2017-08-20 PROBLEM — G93.40 ENCEPHALOPATHY: Status: ACTIVE | Noted: 2017-01-01

## 2017-08-20 PROBLEM — G30.9 DEMENTIA IN ALZHEIMER'S DISEASE (HCC): Status: ACTIVE | Noted: 2017-01-01

## 2017-08-20 NOTE — H&P
History and Physical    Patient: Katrin Logan               Sex: female          DOA: 8/19/2017       YOB: 1935      Age:  81 y.o.        LOS:  LOS: 0 days        Chief Complaint   Patient presents with    Altered mental status         HPI:     Katrin Logan is a 81 y.o. female who presents with c/o altered mental status onset 4 days ago from the memory care centre at [place]. Patient niece reports that she was congested and vomiting 4 days ago and yesterday she had a mobile CXR at [facility] for possible pneumonia but results are unavailable. She also has not been at her normal state, appears more lethargic with associated cough Per report at baseline she is more animated and will murmur incomprehensible words. In the ED CXR shows bibasilar airspace opacities. Patient was started on Vancomycin, Aztreonam and Levaquin. Past Medical History:   Diagnosis Date    Alcoholism [facility])     Alzheimer's dementia     CAD (coronary artery disease)     COPD     DEMENTIA     High cholesterol     Hyperammonemia ([facility] [address]     Hypertension    . No past surgical history on file. No current facility-administered medications on file prior to encounter. Current Outpatient Prescriptions on File Prior to Encounter   Medication Sig Dispense Refill    ferrous sulfate 325 mg (65 mg iron) tablet Take  by mouth Daily (before breakfast).  LORazepam (ATIVAN) 0.5 mg tablet Take 0.5 mg by mouth every evening.  escitalopram (LEXAPRO) 10 mg tablet Take 10 mg by mouth daily.  fexofenadine (ALLEGRA) 180 mg tablet Take 60 mg by mouth daily.  lactulose (CHRONULAC) 10 gram/15 mL solution Take 20 g by mouth two (2) times a day.  mirtazapine (REMERON) 15 mg tablet Take 7.5 mg by mouth nightly.  ondansetron (ZOFRAN ODT) 4 mg disintegrating tablet Take 1 Tab by mouth every eight (8) hours as needed for Nausea.  10 Tab 0    memantine ER (NAMENDA XR) 28 mg capsule Take 28 mg by mouth daily.  estrogen, conjugated,-medroxyPROGESTERone (PREMPRO) 0.625-2.5 mg per tablet Take 1 Tab by mouth daily.  QUEtiapine (SEROQUEL) 25 mg tablet Take 12.5 mg by mouth nightly.  ergocalciferol (VITAMIN D2) 50,000 unit capsule Take 50,000 Units by mouth daily.  celecoxib (CELEBREX) 200 mg capsule Take 200 mg by mouth two (2) times a day.  donepezil (ARICEPT) 23 mg film coated tablet Take 23 mg by mouth nightly.  atorvastatin (LIPITOR) 20 mg tablet Take 20 mg by mouth daily.  conjugated estrogens (PREMARIN) 0.625 mg tablet Take 0.625 mg by mouth.  ranitidine (ZANTAC) 150 mg tablet Take 150 mg by mouth daily.  risperiDONE (RISPERDAL) 0.25 mg tablet Take 0.25 mg by mouth two (2) times a day.  alum-mag hydroxide-simeth (MAALOX ADVANCED) 200-200-20 mg/5 mL Susp Take 30 mL by mouth four (4) times daily as needed.  loperamide (IMODIUM) 2 mg capsule Take 2 mg by mouth every eight (8) hours as needed. Social History     Social History    Marital status:      Spouse name: N/A    Number of children: N/A    Years of education: N/A     Occupational History    Not on file. Social History Main Topics    Smoking status: Former Smoker    Smokeless tobacco: Not on file    Alcohol use Yes    Drug use: Not on file    Sexual activity: Not on file     Other Topics Concern    Not on file     Social History Narrative       Prior to Admission Medications   Prescriptions Last Dose Informant Patient Reported? Taking? LORazepam (ATIVAN) 0.5 mg tablet 8/19/2017 at Unknown time  Yes Yes   Sig: Take 0.5 mg by mouth every evening. QUEtiapine (SEROQUEL) 25 mg tablet   Yes No   Sig: Take 12.5 mg by mouth nightly. alum-mag hydroxide-simeth (MAALOX ADVANCED) 200-200-20 mg/5 mL Susp   Yes No   Sig: Take 30 mL by mouth four (4) times daily as needed.      atorvastatin (LIPITOR) 20 mg tablet Not Taking at Unknown time  Yes No   Sig: Take 20 mg by mouth daily.     celecoxib (CELEBREX) 200 mg capsule Not Taking at Unknown time  Yes No   Sig: Take 200 mg by mouth two (2) times a day. conjugated estrogens (PREMARIN) 0.625 mg tablet Not Taking at Unknown time  Yes No   Sig: Take 0.625 mg by mouth. donepezil (ARICEPT) 23 mg film coated tablet Not Taking at Unknown time  Yes No   Sig: Take 23 mg by mouth nightly.   ergocalciferol (VITAMIN D2) 50,000 unit capsule Not Taking at Unknown time  Yes No   Sig: Take 50,000 Units by mouth daily. escitalopram (LEXAPRO) 10 mg tablet 8/19/2017 at Unknown time  Yes Yes   Sig: Take 10 mg by mouth daily. estrogen, conjugated,-medroxyPROGESTERone (PREMPRO) 0.625-2.5 mg per tablet Not Taking at Unknown time  Yes No   Sig: Take 1 Tab by mouth daily. ferrous sulfate 325 mg (65 mg iron) tablet 8/19/2017 at Unknown time  Yes Yes   Sig: Take  by mouth Daily (before breakfast). fexofenadine (ALLEGRA) 180 mg tablet 8/19/2017 at Unknown time  Yes Yes   Sig: Take 60 mg by mouth daily. lactulose (CHRONULAC) 10 gram/15 mL solution 8/19/2017 at Unknown time  Yes Yes   Sig: Take 20 g by mouth two (2) times a day. loperamide (IMODIUM) 2 mg capsule Unknown at Unknown time  Yes No   Sig: Take 2 mg by mouth every eight (8) hours as needed. memantine ER (NAMENDA XR) 28 mg capsule Not Taking at Unknown time  Yes No   Sig: Take 28 mg by mouth daily. mirtazapine (REMERON) 15 mg tablet 8/18/2017 at Unknown time  Yes Yes   Sig: Take 7.5 mg by mouth nightly. ondansetron (ZOFRAN ODT) 4 mg disintegrating tablet   No No   Sig: Take 1 Tab by mouth every eight (8) hours as needed for Nausea. ranitidine (ZANTAC) 150 mg tablet   Yes No   Sig: Take 150 mg by mouth daily. risperiDONE (RISPERDAL) 0.25 mg tablet Not Taking at Unknown time  Yes No   Sig: Take 0.25 mg by mouth two (2) times a day. Facility-Administered Medications: None       No family history on file.     Allergies   Allergen Reactions    Cephalexin Hives    Skelaxin [Metaxalone] Unknown (comments)       Review of Systems: Unable to obtain due to advanced dementia       Physical Exam:       Visit Vitals    BP 91/58    Pulse 100    Temp 99.7 °F (37.6 °C)    Resp 17    Ht 5' 2\" (1.575 m)    Wt 45.4 kg (100 lb)    SpO2 94%    BMI 18.29 kg/m2     Physical Exam   Constitutional: She appears well-developed. She appears lethargic. She appears ill. Eyes: Conjunctivae and EOM are normal. Pupils are equal, round, and reactive to light. Neck: Neck supple. Cardiovascular: Normal rate, regular rhythm and normal heart sounds. No murmur heard. Pulmonary/Chest: Effort normal. She has rales. Abdominal: Soft. Bowel sounds are normal. She exhibits no distension. There is no tenderness. There is no guarding. Musculoskeletal: She exhibits no edema. Neurological: She appears lethargic. Skin: Skin is warm. No rash noted. No erythema. No pallor. Psychiatric: Cognition and memory are impaired. Ancillary Studies: All lab and imaging reviewed for the past 24 hours. Recent Results (from the past 24 hour(s))   GLUCOSE, POC    Collection Time: 08/19/17  9:59 PM   Result Value Ref Range    Glucose (POC) 136 (H) 70 - 110 mg/dL   CBC WITH AUTOMATED DIFF    Collection Time: 08/19/17 10:00 PM   Result Value Ref Range    WBC 10.1 4.6 - 13.2 K/uL    RBC 3.75 (L) 4.20 - 5.30 M/uL    HGB 9.6 (L) 12.0 - 16.0 g/dL    HCT 31.1 (L) 35.0 - 45.0 %    MCV 82.9 74.0 - 97.0 FL    MCH 25.6 24.0 - 34.0 PG    MCHC 30.9 (L) 31.0 - 37.0 g/dL    RDW 17.8 (H) 11.6 - 14.5 %    PLATELET 135 (H) 600 - 420 K/uL    MPV 9.1 (L) 9.2 - 11.8 FL    NEUTROPHILS 81 (H) 40 - 73 %    LYMPHOCYTES 12 (L) 21 - 52 %    MONOCYTES 6 3 - 10 %    EOSINOPHILS 1 0 - 5 %    BASOPHILS 0 0 - 2 %    ABS. NEUTROPHILS 8.1 (H) 1.8 - 8.0 K/UL    ABS. LYMPHOCYTES 1.2 0.9 - 3.6 K/UL    ABS. MONOCYTES 0.6 0.05 - 1.2 K/UL    ABS. EOSINOPHILS 0.1 0.0 - 0.4 K/UL    ABS.  BASOPHILS 0.0 0.0 - 0.06 K/UL    DF AUTOMATED     METABOLIC PANEL, BASIC    Collection Time: 08/19/17 10:00 PM   Result Value Ref Range    Sodium 142 136 - 145 mmol/L    Potassium 3.5 3.5 - 5.5 mmol/L    Chloride 109 (H) 100 - 108 mmol/L    CO2 24 21 - 32 mmol/L    Anion gap 9 3.0 - 18 mmol/L    Glucose 119 (H) 74 - 99 mg/dL    BUN 23 (H) 7.0 - 18 MG/DL    Creatinine 0.76 0.6 - 1.3 MG/DL    BUN/Creatinine ratio 30 (H) 12 - 20      GFR est AA >60 >60 ml/min/1.73m2    GFR est non-AA >60 >60 ml/min/1.73m2    Calcium 8.5 8.5 - 10.1 MG/DL   EKG, 12 LEAD, INITIAL    Collection Time: 08/19/17 10:04 PM   Result Value Ref Range    Ventricular Rate 110 BPM    Atrial Rate 110 BPM    P-R Interval 158 ms    QRS Duration 100 ms    Q-T Interval 336 ms    QTC Calculation (Bezet) 454 ms    Calculated P Axis 56 degrees    Calculated R Axis -53 degrees    Calculated T Axis 79 degrees    Diagnosis       Sinus tachycardia  Left anterior fascicular block  Abnormal ECG  When compared with ECG of 18-JUN-2017 10:51,  No significant change was found     POC LACTIC ACID    Collection Time: 08/19/17 10:09 PM   Result Value Ref Range    Lactic Acid (POC) 1.0 0.4 - 2.0 mmol/L   URINALYSIS W/ RFLX MICROSCOPIC    Collection Time: 08/19/17 10:10 PM   Result Value Ref Range    Color YELLOW      Appearance CLEAR      Specific gravity 1.024 1.005 - 1.030      pH (UA) 6.0 5.0 - 8.0      Protein NEGATIVE  NEG mg/dL    Glucose NEGATIVE  NEG mg/dL    Ketone NEGATIVE  NEG mg/dL    Bilirubin NEGATIVE  NEG      Blood NEGATIVE  NEG      Urobilinogen 1.0 0.2 - 1.0 EU/dL    Nitrites NEGATIVE  NEG      Leukocyte Esterase NEGATIVE  NEG         Assessment/Plan     Principal Problem:    HCAP (healthcare-associated pneumonia) (8/20/2017)    Active Problems:    Dehydration (12/17/2016)      Encephalopathy (8/20/2017)      Dementia   Dysphagia  CAD  HLD  COPD      PLAN:    Continue Antibiotics   Supportive care   Follow blood culture   Speech eval for dysphagia   NPO for now  IVF Dextrose /1/2NS  DVT prophylaxis DNR      Sang Jeter MD  8/20/2017  12:12 AM

## 2017-08-20 NOTE — PROGRESS NOTES
Patient has designated __________daughter______________ to participate in his/her discharge plan and to receive any needed information.      Name: Danny Carter  Address:  Phone number: [phone]

## 2017-08-20 NOTE — ROUTINE PROCESS
0750  Bedside and Verbal shift change report given to Linda Horton (oncoming nurse) by Marylen Clack (offgoing nurse). Report included the following information SBAR, Kardex, Intake/Output and MAR. Shift assessment complete and due meds given. Pt was brought in non-responsive and continues to be. HR 82 on RA.      1150  Speech into see pt for swallow eval    1500  Reassessment complete. No change in pt condition. Family at bedside.

## 2017-08-20 NOTE — PROGRESS NOTES
See and examine patient however objectively I see no reason for admission   Will likely dc back to [facility] tomorrow  There is no fever, no clear evidence of pna, UA negative. No WBC count  AMS is highly subjective in a memory care patient. Speech eval is prudent.

## 2017-08-20 NOTE — PROGRESS NOTES
Pharmacy Dosing Services: Vancomycin    Indication: HAP    Day of therapy: 0    Other Antimicrobials (Include dose, start day & day of therapy):  Levofloxacin  mg every 48 hours, started ; Day 1 of therapy      Loading dose (date given): 1 gram   Current Maintenance dose: Vancomycin  mg every 12 hours    Goal Vancomycin Level: 15-20 mcg/ml  (Trough 15-20 for most infections, 20 for meningitis/osteomyelitis, pre-HD level ~25)    Vancomycin Level (if drawn): Trough Due  at 0030 hours before the fourth new maintenance dose. Significant Cultures: 19 Aug Blood Cultures: no growth  19 Aug Urine Cx: pending    Renal function stable? (unstable defined as SCr increase of 0.5 mg/dL or > 50% increase from baseline, whichever is greater) (Y/N): Y     CAPD, Hemodialysis or Renal Replacement Therapy (Y/N): N    Recent Labs      17   0400  17   2200   CREA  0.73  0.76   BUN  23*  23*   WBC  9.3  10.1     Temp (24hrs), Av.9 °F (37.2 °C), Min:98.3 °F (36.8 °C), Max:99.7 °F (37.6 °C)    Creatinine Clearance (Creatinine Clearance (ml/min)): 43.3 ml/minute    Regimen assessment: Vancomycin treatment for HAP  Maintenance dose: 750 mg IV every 12 hours  Next scheduled level: 30 hours       Pharmacy will follow daily and adjust medications as appropriate for renal function and/or serum levels. Thank you,  M.D.  Belvie Hamman, Pharmacist

## 2017-08-20 NOTE — PROGRESS NOTES
Nutrition initial assessment/Plan of care      RECOMMENDATIONS:   1. NPO. Advance diet per SLP recommendations  2. Ensure enlive BID chocolate if ok per SLP  3. Monitor weight and PO intake  4. RD to follow     GOALS:   1. PO intake meets >75% of protein/calorie needs by 8/23  2. Weight Maintenance/Gradual weight gain (1-2 lb) by 8/30        ASSESSMENT:   Per BMI of 18.3, weight is in the underweight classification. PO intake is poor. Labs noted. Nutrition recommendations listed. RD to follow. Nutrition Diagnoses:   Inadequate oral intake related to poor appetite as evidenced by NPO order. Nutrition Risk:  [x] High  [] Moderate []  Low    SUBJECTIVE/OBJECTIVE:   Patient admitted for  AMS. She has h/o alcoholism, dementia, COPD and HTN. Per daughter, patient is eating good when someone feeds her. She likes chocolate Ensure. Her UBW is 100 lb. Recently, she has been choking on food at the nursing home. Patient a SLP consult. Information Obtained from:    [x] Chart Review   [x] Patient   [x] Family/Caregiver   [] Nurse/Physician   [] Interdisciplinary Meeting/Rounds    Diet: NPO  Medications: [x] Reviewed    Allergies: [x] Reviewed   Encounter Diagnoses     ICD-10-CM ICD-9-CM   1. HCAP (healthcare-associated pneumonia) J18.9 486   2.  Altered mental status, unspecified altered mental status type R41.82 780.97     Past Medical History:   Diagnosis Date    [facility]ism ([facility] [address]     Alzheimer's dementia     CAD (coronary artery disease)     COPD     DEMENTIA     High cholesterol     Hyperammonemia (HCC)     Hypertension       Labs:    Lab Results   Component Value Date/Time    Sodium 140 08/20/2017 04:00 AM    Potassium 3.9 08/20/2017 04:00 AM    Chloride 108 08/20/2017 04:00 AM    CO2 21 08/20/2017 04:00 AM    Anion gap 11 08/20/2017 04:00 AM    Glucose 129 08/20/2017 04:00 AM    BUN 23 08/20/2017 04:00 AM    Creatinine 0.73 08/20/2017 04:00 AM    Calcium 8.4 08/20/2017 04:00 AM    Magnesium 2.0 06/18/2017 09:25 AM    Albumin 3.1 06/18/2017 09:25 AM     Anthropometrics: BMI (calculated): 18.3  Last 3 Recorded Weights in this Encounter    08/19/17 2358   Weight: 45.4 kg (100 lb)      Ht Readings from Last 1 Encounters:   08/19/17 5' 2\" (1.575 m)       IBW: 110 lb %IBW: 91% UBW: 100 lb %UBW: 100%   [] Weight Loss [] Weight Gain [] Weight Stable    Estimated Nutrition Needs: [x] MSJ  [] Other:  Calories: 1500 kcal Based on:   [x] Actual BW    Protein:   60-70 g Based on:   [x] Actual BW    Fluid:       0945-3474 ml Based on:   [x] Actual BW      [x] No Cultural, Jainism or ethnic dietary need identified.     [] Cultural, Jainism and ethnic food preferences identified and addressed     Wt Status:  [] Normal (18.6 - 24.9) [x] Underweight (<18.5) [] Overweight (25 - 29.9) [] Mild Obesity (30 - 34.9)  [] Moderate Obesity (35 - 39.9) [] Morbid Obesity (40+)   [] Moderate Malnutrition [] Severe Malnutrition in the context of :     Nutrition Problems Identified:   [x] Suboptimal PO intake   [] Food Allergies  [x] Difficulty chewing/swallowing/poor dentition  [] Constipation/Diarrhea   [] Nausea/Vomiting   [] None  [] Other:     Plan:   [] Therapeutic Diet  []  Obtained/adjusted food preferences/tolerances and/or snacks options   [x]  Supplements added   [] Occupational therapy following for feeding techniques  []  HS snack added   []  Modify diet texture   []  Modify diet for food allergies   []  Educate patient   []  Assist with menu selection   [x]  Monitor PO intake on meal rounds   [x]  Continue inpatient monitoring and intervention   []  Participated in discharge planning/Interdisciplinary rounds/Team meetings   []  Other:     Education Needs:   [] Not appropriate for teaching at this time due to:   [x] Identified and addressed    Nutrition Monitoring and Evaluation:  [x] Continue ongoing monitoring and intervention  [] Other    Candice Rivers, [address] [place] [address]  Pager: [phone]

## 2017-08-20 NOTE — PROGRESS NOTES
[facility]   Discharge Planning/ Assessment    Reasons for Intervention: Initial discharge planning interview. Met w pt in room 3001. Pt non- communicative. Pt's daughter (and listed emergency contact) Annie in room. Face sheet data reviewed with daughter. All information confirmed as correct except: added emergency contact- pt's MPOA- son- Ever Cosme. Pt is a DNR per daughter. Pt lives in [address] [address] with [facility] and a private hire sitter Mon- Fri. 12p- 6p. Pt non-ambulatory pre admission and total care. Anticipated discharge plan is return to [place] with hospice and private hire.       High Risk Criteria  [] Yes  [x]No   Physician Referral  [] Yes  []No        Date    Nursing Referral  [] Yes  []No        Date    Patient/Family Request  [] Yes  []No        Date       Resources:    Medicare  [x] Yes  []No   Medicaid  [] Yes  []No   No Resources  [] Yes  []No   Private Insurance  [x] Yes  []No     [employer] Cross    Name/Phone Number    Other  [] Yes  []No        (i.e. Workman's Comp)         Prior Services:    Prior Services  [] Yes  []No   Home Health  [] Yes  []No   [address]  [] Yes  []No        Number of [address]  [] Yes  []No       Meals on Wheels  [] Yes  []No   Office on Aging  [] Yes  []No   Transportation Services  [] Yes  []No   Nursing Home  [] Yes  []No        Nursing Home Name    [facility]  [] Yes  []No        [address] Name    Other [facility]/ private hire sitter      Information Source:      Information obtained from  [] Patient  [] Parent   [] Guardian  [x] Child  [] Spouse   [] Significant Other/Partner   [] Friend      [] EMS    [] Nursing Home Chart          [] Other:   Chart Review  [x] Yes  []No     Family/Support System:    Patient lives with  [] Alone    [] Spouse   [] Significant Other  [] Children  [] Caretaker   [] Parent  [] Sibling     [x] Other Assisted Living      Other Support System:    Is the patient responsible for care of others  [] Yes  [x]No   Information of person caring for patient on  discharge    Managers financial affairs independently  [] Yes  [x]No   If no, explain:      Status Prior to Admission:    Mental Status  [] Awake  [x] Alert  [] Oriented  [] Quiet/Calm [] Lethargic/Sedated   [] Disoriented  [] Restless/Anxious  [] Combative  Non-communicative   Personal Care  [] Dependent  [] [address]  [x] Requires Assistance   Meal Preparation Ability  [] Independent   [] Standby Assistance   [] Minimal Assistance   [] Moderate Assistance  [] Maximum Assistance     [x] Total Assistance   Chores  [] Independent with Chores   [] N/A Nursing Home Resident   [x] Requires Assistance   Bowel/Bladder  [] Continent  [] Catheter  [] Incontinent  [] Ostomy Self-Care    [] Urine Diversion Self-Care  [] Maximum Assistance     [] Total Assistance   Number of Persons needed for assistance    DME at home  [] Mery Cox Bri  [] Elena Cox   [] Commode    [] Bathroom/Grab Bars  [] Hospital Bed  [] Nebulizer  [] Oxygen           [] Raised Toilet Seat  [] Shower Chair  [] Side Rails for Bed   [] Tub Transfer Bench   [] Jessika Wilson  [] [address], [place]      [] Other:   Vendor      Treatment Presently Receiving:    Current Treatments  [] Chemotherapy  [] Dialysis  [] Insulin  [x] IVAB [x] IVF   [] O2  [] PCA   [] PT   [] RT   [] Tube Feedings   [] Wound Care     Psychosocial Evaluation:    Verbalized Knowledge of Disease Process  [] Patient  []Family   Coping with Disease Process  [] Patient  []Family   Requires Further Counseling Coping with Disease Process  [] Patient  []Family     Identified Projected Needs:    Home Health Aid  [] Yes  []No   Transportation  [] Yes  []No   Education  [] Yes  []No        Specific Education     Financial Counseling  [] Yes  []No   Inability to Care for Self/Will Require 24 hour care  [] Yes  []No   Pain Management  [] Yes  []No   Home Infusion Therapy  [] Yes  []No   Oxygen Therapy  [] Yes  []No   DME  [] Yes  []No   Long Term Care Placement  [] Yes  []No   Rehab  [] Yes  []No   Physical Therapy  [] Yes  []No   Needs Anticipated At This Time  [] Yes  [x]No     Intra-Hospital Referral:    [facility]  [] Yes  []No     [] Yes  []No   Patient Representative  [] Yes  []No   Staff for Teaching Needs  [] Yes  []No   Specialty Teaching Needs     Diabetic Educator  [] Yes  []No   Referral for Diabetic Educator Needed  [] Yes  []No  If Yes, place order for Nutritionist or Diabetic Consult     Tentative Discharge Plan:    Home with No Services  [x] Yes  []No  Resumption of services already put in place by family: Hospice, private hire sitter   Home with [address]  [] Yes  []No        If Yes, specify type    [address]  [] Yes  []No        If Yes, specify type    Meals on Wheels  [] Yes  []No   Office of Aging  [] Yes  []No   NHP  [] Yes  []No   Return to the [address]  [] Yes  []No   Rehab Therapy  [] Yes  []No   Acute Rehab  [] Yes  []No   Subacute Rehab  [] Yes  []No   Private Care  [] Yes  []No   Substance Abuse Referral  [] Yes  []No   Transportation  [] Yes  []No   Chore Service  [] Yes  []No   Inpatient Hospice  [] Yes  []No   OP RT  [] Yes  [] No   OP Hemo  [] Yes  [] No   OP PT  [] Yes  []No   Support Group  [] Yes  []No   Reach to Recovery  [] Yes  []No   OP Oncology Clinic  [] Yes  []No   Clinic Appointment  [] Yes  []No   DME  [] Yes  []No   Comments    Name of D/C Planner or  Given to Patient or [facility] 16, RN  [phone]  8/20/17     Phone Number         Extension    Date    Time    If you are discharged home, whom do you designate to participate in your discharge plan and receive any information needed?      Enter name of [address] [place] [address]        Phone # of [place] [phone]        Address of [place]         Updated         Patient refused to designate any           individual

## 2017-08-20 NOTE — ED PROVIDER NOTES
HPI Comments: Patient with a history of dementia sent in from [facility] with altered mental status, unknown last known well, by nursing report she is normally nonverbal, \"animated response\" but decreased recently. No further history can be obtained due to her altered mental status      Patient is a 81 y.o. female presenting with altered mental status. Altered mental status           Past Medical History:   Diagnosis Date    Alcoholism ([facility] [address]     Alzheimer's dementia     CAD (coronary artery disease)     COPD     DEMENTIA     High cholesterol     Hyperammonemia ([facility]     Hypertension        No past surgical history on file. No family history on file. Social History     Social History    Marital status:      Spouse name: N/A    Number of children: N/A    Years of education: N/A     Occupational History    Not on file. Social History Main Topics    Smoking status: Former Smoker    Smokeless tobacco: Not on file    Alcohol use Yes    Drug use: Not on file    Sexual activity: Not on file     Other Topics Concern    Not on file     Social History Narrative         ALLERGIES: Cephalexin and Skelaxin [metaxalone]    Review of Systems   Unable to perform ROS: Dementia       Vitals:    08/19/17 2315 08/19/17 2330 08/19/17 2345 08/19/17 2358   BP: 114/58 111/69 108/87    Pulse: 96 93 97    Resp: 17 18 15    Temp:       SpO2: 95% 96% 96%    Weight:    45.4 kg (100 lb)   Height:    5' 2\" (1.575 m)            Physical Exam   Constitutional:   General: Elderly, warm to touch. Head:  Normocephalic atraumatic. Eyes:  Pupils midrange extraocular movements intact. No pallor or conjunctival injection. Nose:  No rhinorrhea, inspection grossly normal.    Ears:  Grossly normal to inspection, no discharge. Mouth:  Mucous membranes moist, . Neck/Back:  Trachea midline, no asymmetry. Chest:  Grossly normal inspection, symmetric chest rise.     Pulmonary:  Clear to auscultation bilaterally no wheezes rhonchi or rales. Cardiovascular:  S1-S2 no murmurs rubs or gallops. Abdomen: Soft, nontender, nondistended no guarding rebound or peritoneal signs. Extremities:  Grossly normal to inspection, peripheral pulses intact    Neurologic: Flexion contracture in fetal position, eyes closed but arousable to being turned on her side  Skin:  Warm and dry  Nursing note reviewed, vital signs reviewed. MDM  Number of Diagnoses or Management Options  Diagnosis management comments: ED course:  Patient presents with altered mental status, borderline febrile here by rectal temperature. Primary concern is for an infectious etiology. Chest x-ray per my interpretation RIGHT lower lobe infiltrate    EKG was done at 2204 hrs.: Sinus tachycardia heart rate 110 intervals within normal limits, there is no ST changes or ectopy. Labs are unremarkable    Chest x-ray per radiology RIGHT lower lobe infiltrate    Patient was started on antibiotics, reevaluated, she has persistently depressed mental status, she would be unable to take any oral antibiotics at nursing home, she will be admitted for further management. Discussed goals of care with the niece, she is currently a DO NOT RESUSCITATE and did not seem interested in a PEG tube placement but does report that she has been choking on her food recently,      Patient's presentation, history, physical exam and laboratory evaluations were reviewed. I felt the patient would benefit from inpatient management and treatment. Consult:  Discussed care with Dr. Taco Lopez. Standard discussion; including history of patient's chief complaint, available diagnostic results, and treatment course. Patient was accepted to their service. Disposition:    Admitted to medicine service      Portions of this chart were created with Dragon medical speech to text program.   Unrecognized errors may be present.       ED Course       Procedures

## 2017-08-20 NOTE — PROGRESS NOTES
Problem: Discharge Planning  Goal: *Discharge to safe environment  Outcome: Progressing Towards Goal  Return to [facility] hospice and private hire sonya

## 2017-08-20 NOTE — PROGRESS NOTES
Problem: Dysphagia (Adult)  Goal: *Acute Goals and Plan of Care (Insert Text)  Recommendations:  Diet: Cautious Puree with Nectar Thick Liquids (1/2 tsp bites/sips)  Meds: Crushed in puree  Aspiration Precautions  Oral Care TID  Other: slow rate of intake    Goals: Patient will:  1. Tolerate PO trials with 0 s/s overt distress in 4/5 trials  2. Utilize compensatory swallow strategies/maneuvers (decrease bite/sip, size/rate, alt. liq/sol) with min cues in 4/5 trials  [address] EVALUATION     Patient: Deepthi Hurtado (18 y.o. female)  Date: 8/20/2017  Primary Diagnosis: HCAP (healthcare-associated pneumonia)  Encephalopathy  Dehydration  HCAP (healthcare-associated pneumonia)  Anemia  Encephalopathy  Thrombocytosis ([facility] [address]  Dehydration  Dementia in Alzheimer's disease  Dysphagia  HCAP (healthcare-associated pneumonia)        Precautions: Aspiration         ASSESSMENT :  Based on the objective data described below, the patient presents with moderate/severe oropharyngeal dysphagia. Patient confused with baseline dementia appreciated during clinical bedside evaluation. Oral motor structures appeared intact during evaluation; difficult to assess as patient unable to follow commands. Daughter at bedside during the evaluation. Baseline wet vocal quality. HOB increased to 60; attempted to re-position the patient to midline. Appears to prefer L sidelying. Unable to utilize a straw; poor labial seal to accept PO via cup/sips. Absent swallow initiation with thin liquids; weak hyolaryngeal elevation with nectar thick liquids via 1/2 tsp sips across 5 of 5 trials without event across trials. Tolerated pureed solids in 5 of 5 trials via 1/2 tsp bites without event. Recommend HOB>45* at all times as patient is a high aspiration risk. Education re: positioning, diet textures/consistencies, aspiration risk, and dysphagia management provided. D/w RN and daughter; all verbalized understanding.    Patient will benefit from skilled intervention to address the above impairments. Patients rehabilitation potential is considered to be Poor - Hospice  Factors which may influence rehabilitation potential include:   [X]            Mental ability/status  [X]            Medical condition  [X]            Safety awareness       PLAN :  Recommendations and Planned Interventions:  As above. Frequency/Duration: Patient will be followed by speech-language pathology 3 times a week to address goals. Discharge Recommendations: To Be Determined vs Hospice       SUBJECTIVE:   Patient stated . ..good. OBJECTIVE:       Past Medical History:   Diagnosis Date    Alcoholism ([place] [address]      Alzheimer's dementia      CAD (coronary artery disease)      COPD      DEMENTIA      High cholesterol      Hyperammonemia (H[facility])      Hypertension     No past surgical history on file. Prior Level of Function/Home Situation: Per daughter/chart  Home Situation  [facility] [address] [facility] [place]  One/Two Story Residence: Two story, live on 1st floor  Living Alone: No  Support Systems: Family member(s), Assisted living  Patient Expects to be Discharged to[de-identified] Assisted living  Current DME Used/Available at Home: Other (comment) ([facility])  Diet prior to admission: regular with thin   Current Diet:  Puree with nectar thick    Cognitive and Communication Status:  Neurologic State: Confused, Irritable, Eyes open to voice  Orientation Level: Disoriented X4  Cognition: Memory loss, No command following  Perception: Tactile, Verbal, Visual  Perseveration: Tactile cues provided, Verbal cues provided, Visual cues provided  Safety/Judgement: Lack of insight into deficits  Oral Assessment:  Oral Assessment  Labial: Impaired coordination;Decreased seal  Dentition: Intact  Oral Hygiene: fair  Lingual: Decreased rate; Incoordinated  Velum: Unable to visualize  Mandible: No impairment  P.O.  Trials:  Patient Position: 61 HOB (however leans to the R; dtr recognizes ; difficult to re-pos)  Vocal quality prior to P.O.: Wet  Consistency Presented: Thin liquid; Nectar thick liquid;Puree  How Presented: SLP-fed/presented;Spoon;Straw; Other (comment) (chews straw; )  How Much: 10  Bolus Acceptance: Impaired  Bolus Formation/Control: Impaired  Type of Impairment: Lip closure;Delayed; Anterior;Spillage  Propulsion: Delayed (# of seconds)  Oral Residue: None  Initiation of Swallow: Delayed (# of seconds)  Laryngeal Elevation: Weak;Absent (absent with thin; weak with puree/ntl)  Aspiration Signs/Symptoms: None; Other (comment) (baseline voice is wet)  Pharyngeal Phase Characteristics: Altered vocal quality; Suspected pharyngeal residue  Effective Modifications: Spoon;Small sips and bites  Cues for Modifications: Maximal  Comments: daughter in room during assessment     Oral Phase Severity: Moderate-severe  Pharyngeal Phase Severity : Moderate-severe     GCODESwallowing:  Swallow Current Status CL= 60-79%   Swallow Goal Status CL= 60-79%     The severity rating is based on the following outcomes:  [employer] [place] Swallow Level 3              Clinical Judgement     PAIN:  Start of Eval: 0 reported   End of Eval: 0 reported      After treatment:   [ ]            Patient left in no apparent distress sitting up in chair  [X]            Patient left in no apparent distress in bed  [X]            Call bell left within reach  [X]            Nursing notified  [X]            Family present  [ ]            Caregiver present  [ ]            Bed alarm activated      COMMUNICATION/EDUCATION:   [X]            Aspiration precautions; swallow safety; compensatory techniques. [X]            Patient/family have participated as able in goal setting and plan of care. [X]            Patient/family agree to work toward stated goals and plan of care. [ ]            Patient understands intent and goals of therapy; neutral about participation.   [X] Patient unable to participate in goal setting/plan of care; educ ongoing with interdisciplinary staff  [X]            Posted safety precautions in patient's room.      Thank you for this referral.  RUBA Coates  Time Calculation: 13 mins

## 2017-08-20 NOTE — ED TRIAGE NOTES
Pt presents by EMS from the [facility] with increased lethargy, AMS. Pt does not follow commands. End stage Alzheimer's.

## 2017-08-20 NOTE — PROGRESS NOTES
Problem: Falls - Risk of  Goal: *Absence of Falls  Document Jon Fall Risk and appropriate interventions in the flowsheet.    Outcome: Progressing Towards Goal  Fall Risk Interventions:        Mentation Interventions: Bed/chair exit alarm, Door open when patient unattended, More frequent rounding, Reorient patient, Room close to nurse's station     Medication Interventions: Evaluate medications/consider consulting pharmacy, Bed/chair exit alarm     Elimination Interventions: Call light in reach, Toileting schedule/hourly rounds     History of Falls Interventions: Bed/chair exit alarm, Door open when patient unattended, Room close to nurse's station

## 2017-08-21 NOTE — PROGRESS NOTES
Spoke with Elizabeth @ [facility], made her aware of observation status & discharge today, ok for pt to return. [facility] medical transport set up for 1300. Family made aware of above & agreeable to transfer. Nursing made aware of above. Available as needed. Fatimah LeahyRN,ext. [phone].

## 2017-08-21 NOTE — DISCHARGE SUMMARY
Discharge Summary    Patient: Eb Eaton               Sex: female          DOA: 8/19/2017         YOB: 1935      Age:  81 y.o.        LOS:  LOS: 1 day                Admit Date: 8/19/2017    Discharge Date: 8/21/2017    Discharge Medications:     Current Discharge Medication List      START taking these medications    Details   levoFLOXacin (LEVAQUIN) 750 mg tablet Take 1 Tab by mouth daily. Qty: 5 Tab, Refills: 0         CONTINUE these medications which have CHANGED    Details   LORazepam (INTENSOL) 2 mg/mL concentrated solution Take 0.5 mL by mouth every four (4) hours as needed for Anxiety. Max Daily Amount: 6 mg. Qty: 30 mL, Refills: 0      morphine (ROXANOL) 100 mg/5 mL (20 mg/mL) concentrated solution 0.25 mL by SubLINGual route every four (4) hours as needed for Pain. Max Daily Amount: 30 mg.  Qty: 30 mL, Refills: 0         CONTINUE these medications which have NOT CHANGED    Details   melatonin 3 mg tablet Take 3 mg by mouth nightly. amino acids-protein hydrolys (PRO-STAT AWC)  gram-kcal/30 mL liqd Take 30 mL by mouth daily. senna-docusate (SENNA LAXATIVE-STOOL SOFTENER) 8.6-50 mg per tablet Take 1 Tab by mouth two (2) times a day. ferrous sulfate 325 mg (65 mg iron) tablet Take  by mouth Daily (before breakfast). QUEtiapine (SEROQUEL) 25 mg tablet Take 12.5 mg by mouth nightly. escitalopram (LEXAPRO) 10 mg tablet Take 10 mg by mouth daily. fexofenadine (ALLEGRA) 180 mg tablet Take 60 mg by mouth daily. lactulose (CHRONULAC) 10 gram/15 mL solution Take 20 g by mouth two (2) times a day. mirtazapine (REMERON) 15 mg tablet Take 7.5 mg by mouth nightly. ranitidine (ZANTAC) 150 mg tablet Take 150 mg by mouth daily. atropine 1 % ophthalmic solution 2 Drops by SubLINGual route every two (2) hours as needed (to dry secretions). HALOPERIDOL LACTATE (HALDOL INJECTION) Take 1 mg by mouth every four (4) hours as needed (agitation). hydrOXYzine HCl (ATARAX) 25 mg tablet Take 25 mg by mouth every six (6) hours as needed for Itching. acetaminophen (TYLENOL) 325 mg tablet Take 650 mg by mouth every four (4) hours as needed for Pain or Fever. magnesium hydroxide (MENEZES MILK OF MAGNESIA) 400 mg/5 mL suspension Take 30 mL by mouth every four (4) hours as needed for Constipation. guaiFENesin (ROBAFEN) 100 mg/5 mL liquid Take 300 mg by mouth every four (4) hours as needed for Cough. ondansetron (ZOFRAN ODT) 4 mg disintegrating tablet Take 1 Tab by mouth every eight (8) hours as needed for Nausea. Qty: 10 Tab, Refills: 0      memantine ER (NAMENDA XR) 28 mg capsule Take 28 mg by mouth daily. estrogen, conjugated,-medroxyPROGESTERone (PREMPRO) 0.625-2.5 mg per tablet Take 1 Tab by mouth daily. ergocalciferol (VITAMIN D2) 50,000 unit capsule Take 50,000 Units by mouth daily. celecoxib (CELEBREX) 200 mg capsule Take 200 mg by mouth two (2) times a day. donepezil (ARICEPT) 23 mg film coated tablet Take 23 mg by mouth nightly. atorvastatin (LIPITOR) 20 mg tablet Take 20 mg by mouth daily. conjugated estrogens (PREMARIN) 0.625 mg tablet Take 0.625 mg by mouth. risperiDONE (RISPERDAL) 0.25 mg tablet Take 0.25 mg by mouth two (2) times a day. alum-mag hydroxide-simeth (MAALOX ADVANCED) 200-200-20 mg/5 mL Susp Take 30 mL by mouth four (4) times daily as needed. loperamide (IMODIUM) 2 mg capsule Take 2 mg by mouth every eight (8) hours as needed. STOP taking these medications       LORazepam (ATIVAN) 0.5 mg tablet Comments:   Reason for Stopping:                Follow-up: PCP    Discharge Condition: Stable    Activity: Activity as tolerated    Diet: pureed, nectar thick liquids    Labs:  Labs: Results:       Chemistry Recent Labs      08/20/17   0400  08/19/17   2200   GLU  129*  119*   NA  140  142   K  3.9  3.5   CL  108  109*   CO2  21  24   BUN  23*  23*   CREA  0.73 0. 76   CA  8.4*  8.5   AGAP  11  9   BUCR  32*  30*      CBC w/Diff Recent Labs      08/20/17   0400  08/19/17   2200   WBC  9.3  10.1   RBC  3.19*  3.75*   HGB  8.2*  9.6*   HCT  26.8*  31.1*   PLT  438*  501*   GRANS  78*  81*   LYMPH  15*  12*   EOS  1  1      Cardiac Enzymes No results for input(s): CPK, CKND1, ROB in the last 72 hours. No lab exists for component: CKRMB, TROIP   Coagulation Recent Labs      08/21/17   0410   PTP  14.0   INR  1.1       Lipid Panel No results found for: CHOL, CHOLPOCT, CHOLX, CHLST, CHOLV, 241899, HDL, LDL, LDLC, DLDLP, 905692, VLDLC, VLDL, TGLX, TRIGL, TRIGP, TGLPOCT, CHHD, CHHDX   BNP No results for input(s): BNPP in the last 72 hours. Liver Enzymes No results for input(s): TP, ALB, TBIL, AP, SGOT, GPT in the last 72 hours. No lab exists for component: DBIL   Thyroid Studies No results found for: T4, T3U, TSH, TSHEXT       Consults: None    Treatment Team: Treatment Team: Attending Provider: Renzo Suarez MD; Consulting Provider: Omi Garnett MD; Utilization Review: Johnna Solorio; Utilization Review:  Raoul Gurrola RN; Care Manager: Matilda Mistry    Significant Diagnostic Studies: labs:   Recent Results (from the past 24 hour(s))   PROTHROMBIN TIME + INR    Collection Time: 08/21/17  4:10 AM   Result Value Ref Range    Prothrombin time 14.0 11.5 - 15.2 sec    INR 1.1 0.8 - 1.2           Discharge diagnoses:    Problem List as of 8/21/2017  Date Reviewed: 12/17/2016          Codes Class Noted - Resolved    Encephalopathy ICD-10-CM: G93.40  ICD-9-CM: 348.30  8/20/2017 - Present        * (Principal)HCAP (healthcare-associated pneumonia) ICD-10-CM: J18.9  ICD-9-CM: 486  8/20/2017 - Present        Anemia ICD-10-CM: D64.9  ICD-9-CM: 285.9  8/20/2017 - Present        Dementia in Alzheimer's disease ICD-10-CM: G30.9, F02.80  ICD-9-CM: 331.0, 294.10  8/20/2017 - Present        [facility] ICD-10-CM: D47.3  ICD-9-CM: 238.71  8/20/2017 - Present Dysphagia ICD-10-CM: R13.10  ICD-9-CM: 787.20  8/20/2017 - Present        MAG (acute kidney injury) ([facility] ICD-10-CM: N17.9  ICD-9-CM: 584.9  12/17/2016 - Present        Falls ICD-10-CM: W19. Jason Santana  ICD-9-CM: E888.9  12/17/2016 - Present        Dehydration ICD-10-CM: E86.0  ICD-9-CM: 276.51  12/17/2016 - Present        Alzheimer's dementia with behavioral disturbance ICD-10-CM: G30.8, F02.81  ICD-9-CM: 331.0, 294.11  12/17/2016 - Present            Hospital Course:   Major issues addressed during hospitalization outlined  below. Nelida Peña is a 81 y.o. female who presents with c/o altered mental status onset 4 days ago from the memory care centre at [place]. Patient niece reports that she was congested and vomiting 4 days ago and yesterday she had a mobile CXR at [place] for possible pneumonia but results are unavailable. She also has not been at her normal state, appears more lethargic with associated cough Per report at baseline she is more animated and will murmur incomprehensible words. In the ED CXR shows bibasilar airspace opacities. Patient was started on Vancomycin, Aztreonam and Levaquin    Patient continued on broad spectrum abx for possible pna. She was switched to PO levaquin on dc. She did well, not requiring 02. Speech consulted for diet recs and put patient on pureed with nectar thick liquids. She will continue on such on discharge.   She will return to snf    Nicholas Rodriguez MD  August 21, 2017        Total time spent 40 minutes

## 2017-08-21 NOTE — ANCILLARY DISCHARGE INSTRUCTIONS
Core Measures Patient, RRAT Score is 16 - Patient will be visited bt KRISTAL Leon on 8/24/17 at [facility].

## 2017-08-21 NOTE — PROGRESS NOTES
Patient and/or next of kin has been given and has signed the [facility] Outpatient Observation  Notification letter and all questions answered. Copy of this notice given to patient and copy placed on chart. Patient and/or next of kin has been given the Outpatient Observation Information and Notification letter and all questions answered. Fatimah Leahy RN,ext. [phone].

## 2017-08-21 NOTE — DISCHARGE INSTRUCTIONS
Patient armband removed and shredded  DISCHARGE SUMMARY from Nurse    The following personal items are in your possession at time of discharge:    Dental Appliances: None  Visual Aid: At home     Home Medications: None  Jewelry: None  Clothing: None  Other Valuables: Other (comment) (pillow)             PATIENT INSTRUCTIONS:    After general anesthesia or intravenous sedation, for 24 hours or while taking prescription Narcotics:  · Limit your activities  · Do not drive and operate hazardous machinery  · Do not make important personal or business decisions  · Do  not drink alcoholic beverages  · If you have not urinated within 8 hours after discharge, please contact your surgeon on call. Report the following to your surgeon:  · Excessive pain, swelling, redness or odor of or around the surgical area  · Temperature over 100.5  · Nausea and vomiting lasting longer than 4 hours or if unable to take medications  · Any signs of decreased circulation or nerve impairment to extremity: change in color, persistent  numbness, tingling, coldness or increase pain  · Any questions        What to do at Home:  Recommended activity: Activity as tolerated,     If you experience any of the following symptoms excessive congestion, altered mental status or a fever, please follow up with your primary care physician or nearest ED. *  Please give a list of your current medications to your Primary Care Provider. *  Please update this list whenever your medications are discontinued, doses are      changed, or new medications (including over-the-counter products) are added. *  Please carry medication information at all times in case of emergency situations. These are general instructions for a healthy lifestyle:    No smoking/ No tobacco products/ Avoid exposure to second hand smoke    Surgeon General's Warning:  Quitting smoking now greatly reduces serious risk to your health.     Obesity, smoking, and sedentary lifestyle greatly increases your risk for illness    A healthy diet, regular physical exercise & weight monitoring are important for maintaining a healthy lifestyle    You may be retaining fluid if you have a history of heart failure or if you experience any of the following symptoms:  Weight gain of 3 pounds or more overnight or 5 pounds in a week, increased swelling in our hands or feet or shortness of breath while lying flat in bed. Please call your doctor as soon as you notice any of these symptoms; do not wait until your next office visit. Recognize signs and symptoms of STROKE:    F-face looks uneven    A-arms unable to move or move unevenly    S-speech slurred or non-existent    T-time-call 911 as soon as signs and symptoms begin-DO NOT go       Back to bed or wait to see if you get better-TIME IS BRAIN. Warning Signs of HEART ATTACK     Call 911 if you have these symptoms:   Chest discomfort. Most heart attacks involve discomfort in the center of the chest that lasts more than a few minutes, or that goes away and comes back. It can feel like uncomfortable pressure, squeezing, fullness, or pain.  Discomfort in other areas of the upper body. Symptoms can include pain or discomfort in one or both arms, the back, neck, jaw, or stomach.  Shortness of breath with or without chest discomfort.  Other signs may include breaking out in a cold sweat, nausea, or lightheadedness. Don't wait more than five minutes to call 911 - MINUTES MATTER! Fast action can save your life. Calling 911 is almost always the fastest way to get lifesaving treatment. Emergency Medical Services staff can begin treatment when they arrive -- up to an hour sooner than if someone gets to the hospital by car. The discharge information has been reviewed with the patient and caregiver. The patient and caregiver verbalized understanding.     Discharge medications reviewed with the patient and caregiver and appropriate educational materials and side effects teaching were provided.

## 2017-08-21 NOTE — PROGRESS NOTES
conducted an initial consultation and Spiritual Assessment for Katrin Logan, who is a 81 y.o.,female. Patients Primary Language is: Luz Maria Reddy. According to the patients EMR [employer] Affiliation is: Greg Santillan. The reason the Patient came to the hospital is:   Patient Active Problem List    Diagnosis Date Noted    Encephalopathy 08/20/2017    HCAP (healthcare-associated pneumonia) 08/20/2017    Anemia 08/20/2017    Dementia in Alzheimer's disease 08/20/2017    Thrombocytosis ([facility] 08/20/2017    Dysphagia 08/20/2017    MAG (acute kidney injury) ([facility] 12/17/2016    Falls 12/17/2016    Dehydration 12/17/2016    Alzheimer's dementia with behavioral disturbance 12/17/2016        The  provided the following Interventions:  Initiated a relationship of care and support. Explored issues of donald, spirituality and/or Jehovah's witness needs while hospitalized. Listened empathically. Provided chaplaincy education. Provided information about Spiritual Care Services. Offered prayer and assurance of continued prayers on patient's behalf. Chart reviewed. The following outcomes were achieved:  Patient shared some information about their medical narrative and spiritual journey/beliefs. Patient processed feeling about current hospitalization. Patient expressed gratitude for the 's visit. Assessment:  Patient did not indicate any spiritual or Jehovah's witness issues which require Spiritual Care Services interventions at this time. Patient does not have any Jehovah's witness/cultural needs that will affect patients preferences in health care. Plan:  Chaplains will continue to follow and will provide pastoral care on an as needed or requested basis.  recommends bedside caregivers page  on duty if patient shows signs of acute spiritual or emotional distress.     [facility]   Staff [address] [facility]   [phone]

## 2017-08-21 NOTE — PROGRESS NOTES
: Bedside shift change report given to Konrad Houston RN (oncoming nurse) by Shazia Laboy RN (offgoing nurse). Report included the following information SBAR, Kardex, Procedure Summary, Intake/Output and MAR.     0030: Daughter staying at bedside with patient. No concerns at this time.

## 2017-08-21 NOTE — PROGRESS NOTES
0800:  Bedside and Verbal shift change report given to Katherine Hawkins RN (oncoming nurse) by Jeff Toro RN (offgoing nurse). Report included the following information SBAR, Kardex, MAR and Recent Results. Patient sleeping comfortably in bed on telemetry with family/visitors at bedside. 2175:  IV antibiotics administered. Patient being D/C to [place] with transport picking up at 1300.    1210:  Called Kailyn for report, left message with Neftali Chinchilla at .

## 2017-11-12 PROBLEM — J18.9 PNA (PNEUMONIA): Status: ACTIVE | Noted: 2017-01-01

## 2017-11-12 NOTE — IP AVS SNAPSHOT
[address] [address] 
[phone] Patient: Madeleine Wheatley MRN: [id number] RKB:[birth year] My Medications STOP taking these medications ALLEGRA 180 mg tablet Generic drug:  fexofenadine  
   
  
 atorvastatin 20 mg tablet Commonly known as:  LIPITOR  
   
  
 atropine 1 % ophthalmic solution  
   
  
 ferrous sulfate 325 mg (65 mg iron) tablet  
   
  
 levoFLOXacin 750 mg tablet Commonly known as:  LEVAQUIN  
   
  
 PREMARIN 0.625 mg tablet Generic drug:  conjugated estrogens PREMPRO 0.625-2.5 mg per tablet Generic drug:  estrogen (conjugated)-medroxyPROGESTERone PRO-STAT AWC  gram-kcal/30 mL Liqd Generic drug:  amino acids-protein hydrolys ROBAFEN 100 mg/5 mL liquid Generic drug:  guaiFENesin VITAMIN D2 50,000 unit capsule Generic drug:  ergocalciferol ZANTAC 150 mg tablet Generic drug:  raNITIdine TAKE these medications as instructed Instructions Each Dose to Equal  
 Morning Noon Evening Bedtime ARICEPT 23 mg film coated tablet Generic drug:  donepezil Your last dose was: Your next dose is: Take 23 mg by mouth nightly. 23 mg  
    
   
   
   
  
 CeleBREX 200 mg capsule Generic drug:  celecoxib Your last dose was: Your next dose is: Take 200 mg by mouth two (2) times a day. 200 mg  
    
   
   
   
  
 escitalopram oxalate 10 mg tablet Commonly known as:  Arty Athens Your last dose was: Your next dose is: Take 10 mg by mouth daily. 10 mg HALDOL INJECTION Your last dose was: Your next dose is: Take 1 mg by mouth every four (4) hours as needed (agitation). 1 mg  
    
   
   
   
  
 hydrOXYzine HCl 25 mg tablet Commonly known as:  ATARAX Your last dose was: Your next dose is: Take 25 mg by mouth every six (6) hours as needed for Itching. 25 mg  
    
   
   
   
  
 lactulose 10 gram/15 mL solution Commonly known as:  [place] Quiver Your last dose was: Your next dose is: Take 20 g by mouth two (2) times a day. 20 g  
    
   
   
   
  
 loperamide 2 mg capsule Commonly known as:  IMODIUM Your last dose was: Your next dose is: Take 2 mg by mouth every eight (8) hours as needed. 2 mg LORazepam 2 mg/mL concentrated solution Commonly known as:  INTENSOL Your last dose was: Your next dose is: Take 0.5 mL by mouth every four (4) hours as needed for Anxiety. Max Daily Amount: 6 mg.  
 1 mg MAALOX ADVANCED 200-200-20 mg/5 mL Susp Generic drug:  alum-mag hydroxide-simeth Your last dose was: Your next dose is: Take 30 mL by mouth four (4) times daily as needed. 30 mL  
    
   
   
   
  
 melatonin 3 mg tablet Your last dose was: Your next dose is: Take 3 mg by mouth nightly. 3 mg  
    
   
   
   
  
 morphine 100 mg/5 mL (20 mg/mL) concentrated solution Commonly known as:  Sandhya Juanita Your last dose was: Your next dose is:    
   
   
 0.25 mL by SubLINGual route every four (4) hours as needed for Pain. Max Daily Amount: 30 mg.  
 5 mg NAMENDA XR 28 mg capsule Generic drug:  memantine ER Your last dose was: Your next dose is: Take 28 mg by mouth daily. 28 mg  
    
   
   
   
  
 ondansetron 4 mg disintegrating tablet Commonly known as:  ZOFRAN ODT Your last dose was: Your next dose is: Take 1 Tab by mouth every eight (8) hours as needed for Nausea. 4 mg MENEZES MILK OF MAGNESIA 400 mg/5 mL suspension Generic drug:  magnesium hydroxide Your last dose was: Your next dose is: Take 30 mL by mouth every four (4) hours as needed for Constipation. 30 mL QUEtiapine 25 mg tablet Commonly known as:  SEROquel Your last dose was: Your next dose is: Take 12.5 mg by mouth nightly. 12.5 mg  
    
   
   
   
  
 REMERON 15 mg tablet Generic drug:  mirtazapine Your last dose was: Your next dose is: Take 7.5 mg by mouth nightly. 7.5 mg  
    
   
   
   
  
 RisperDAL 0.25 mg tablet Generic drug:  risperiDONE Your last dose was: Your next dose is: Take 0.25 mg by mouth two (2) times a day. 0.25 mg SENNA LAXATIVE-STOOL SOFTENER 8.6-50 mg per tablet Generic drug:  senna-docusate Your last dose was: Your next dose is: Take 1 Tab by mouth two (2) times a day. 1 Tab TYLENOL 325 mg tablet Generic drug:  acetaminophen Your last dose was: Your next dose is: Take 650 mg by mouth every four (4) hours as needed for Pain or Fever.   
 650 mg

## 2017-11-12 NOTE — ED NOTES
/53 - HR 90 - seen at [facility] over - D/w Dr Estefania Elias, hospitalist for admission

## 2017-11-12 NOTE — H&P
Medicine History and Physical    Patient: Sean Nayak   Age:  81 y.o. Assessment   Principal Problem:    PNA (pneumonia) (11/12/2017)    Active Problems:    Alzheimer's dementia with behavioral disturbance (12/17/2016)      Dysphagia (8/20/2017)          Plan     1)  PNA   - aspiration vs HCAP   - Vanc and levaquin   - Speech consult   - NPO now   - hx of dysphagia definitely makes aspiration more likely however LLL no RML/RLL    2)  Dementia with behavioral disturbance   - for all medical issues we need to request a good medlist   - appears on aricept namenda, lexapro and risperidal     Will hold hospice type medications. The Family does not have a good handle of what hospice is and the services they provide and it appears to me that there current Hospice provider Zee Alvarez is doing a very POOR job at helping this patient and her family so I would recommend please call hospice in AM and have a representative speak to family    DISPO    Anticipated Date of Discharge: 1-2 days  Anticipated Disposition (home, SNF) : SNF ([facility])    Chief Complaint:   Chief Complaint   Patient presents with    Respiratory Distress         HPI:   Sean Nayak is a 81y.o. year old female who presents with  Choking episode at [facility]. Apparently she required suction. She is baseline on honey thickened pureed diet. She was getting this for lunch and choked. Sats were low in EMS. Family called EMS as they could not get a hospice nurse there quickly. She is doing better now likely will not need 02 for long.   To note she was here for pna in august.        Review of Systems - unable to attain demented, nonverbal      Past Medical History:  Past Medical History:   Diagnosis Date    [facility]ism ([facility] [address]     Alzheimer's dementia     CAD (coronary artery disease)     COPD     DEMENTIA     High cholesterol     Hyperammonemia ([facility] [address]     Hypertension        Past Surgical History:  No past surgical history on file.    Family History:  No family history on file. Social History:  Social History     Social History    Marital status:      Spouse name: N/A    Number of children: N/A    Years of education: N/A     Social History Main Topics    Smoking status: Former Smoker    Smokeless tobacco: Not on file    Alcohol use Yes    Drug use: Not on file    Sexual activity: Not on file     Other Topics Concern    Not on file     Social History Narrative       Home Medications:  Prior to Admission medications    Medication Sig Start Date End Date Taking? Authorizing Provider   LORazepam (INTENSOL) 2 mg/mL concentrated solution Take 0.5 mL by mouth every four (4) hours as needed for Anxiety. Max Daily Amount: 6 mg. 8/21/17   Candice Dumont MD   morphine (ROXANOL) 100 mg/5 mL (20 mg/mL) concentrated solution 0.25 mL by SubLINGual route every four (4) hours as needed for Pain. Max Daily Amount: 30 mg. 8/21/17   Candice Dumont MD   levoFLOXacin (LEVAQUIN) 750 mg tablet Take 1 Tab by mouth daily. 8/21/17   Candice Dumont MD   melatonin 3 mg tablet Take 3 mg by mouth nightly. Rika Joel MD   amino acids-protein hydrolys (PRO-STAT AWC)  gram-kcal/30 mL liqd Take 30 mL by mouth daily. Rika Joel MD   senna-docusate (SENNA LAXATIVE-STOOL SOFTENER) 8.6-50 mg per tablet Take 1 Tab by mouth two (2) times a day. Rika Joel MD   atropine 1 % ophthalmic solution 2 Drops by SubLINGual route every two (2) hours as needed (to dry secretions). Rika Joel MD   HALOPERIDOL LACTATE (HALDOL INJECTION) Take 1 mg by mouth every four (4) hours as needed (agitation). Rika Jole MD   hydrOXYzine HCl (ATARAX) 25 mg tablet Take 25 mg by mouth every six (6) hours as needed for Itching. Rika Joel MD   acetaminophen (TYLENOL) 325 mg tablet Take 650 mg by mouth every four (4) hours as needed for Pain or Fever.     Rika Joel MD   magnesium hydroxide (MENEZES MILK OF MAGNESIA) 400 mg/5 mL suspension Take 30 mL by mouth every four (4) hours as needed for Constipation. Rika Joel MD   guaiFENesin (ROBAFEN) 100 mg/5 mL liquid Take 300 mg by mouth every four (4) hours as needed for Cough. Rika Joel MD   ondansetron (ZOFRAN ODT) 4 mg disintegrating tablet Take 1 Tab by mouth every eight (8) hours as needed for Nausea. 6/18/17   Marylu Chanel NP   ferrous sulfate 325 mg (65 mg iron) tablet Take  by mouth Daily (before breakfast). Rika Joel MD   memantine ER (NAMENDA XR) 28 mg capsule Take 28 mg by mouth daily. Rika Joel MD   estrogen, conjugated,-medroxyPROGESTERone (PREMPRO) 0.625-2.5 mg per tablet Take 1 Tab by mouth daily. Rika Joel MD   QUEtiapine (SEROQUEL) 25 mg tablet Take 12.5 mg by mouth nightly. Rika Joel MD   ergocalciferol (VITAMIN D2) 50,000 unit capsule Take 50,000 Units by mouth daily. Rika Joel MD   celecoxib (CELEBREX) 200 mg capsule Take 200 mg by mouth two (2) times a day. Rika Joel MD   donepezil (ARICEPT) 23 mg film coated tablet Take 23 mg by mouth nightly. Rika Joel MD   atorvastatin (LIPITOR) 20 mg tablet Take 20 mg by mouth daily. Rika Joel MD   escitalopram (LEXAPRO) 10 mg tablet Take 10 mg by mouth daily. Rika Joel MD   fexofenadine (ALLEGRA) 180 mg tablet Take 60 mg by mouth daily. Rika Joel MD   lactulose (CHRONULAC) 10 gram/15 mL solution Take 20 g by mouth two (2) times a day. Rika Joel MD   mirtazapine (REMERON) 15 mg tablet Take 7.5 mg by mouth nightly. Rika Joel MD   conjugated estrogens (PREMARIN) 0.625 mg tablet Take 0.625 mg by mouth. Rika Joel MD   ranitidine (ZANTAC) 150 mg tablet Take 150 mg by mouth daily. Rika Joel MD   risperiDONE (RISPERDAL) 0.25 mg tablet Take 0.25 mg by mouth two (2) times a day. Rika Joel MD   alum-mag hydroxide-simeth (MAALOX ADVANCED) 200-200-20 mg/5 mL Susp Take 30 mL by mouth four (4) times daily as needed.       Phys Other, MD   loperamide (IMODIUM) 2 mg capsule Take 2 mg by mouth every eight (8) hours as needed. Rika Joel MD       Allergies: Allergies   Allergen Reactions    Cephalexin Hives    Skelaxin [Metaxalone] Unknown (comments)           Physical Exam:     Visit Vitals    /53    Pulse 91    Resp 21    Ht 4' 8\" (1.422 m)    Wt 40.8 kg (90 lb)    SpO2 100%    BMI 20.18 kg/m2       Physical Exam:  General appearance: alert, non communicative, NAD  Head: unrelenting grinding of teeth, she started this weeks ago- perhaps a side effect of SSRI or antipyschotic? Neck: supple, trachea midline  Lungs: clear to auscultation bilaterally  Heart: regular rate and rhythm, S1, S2 normal, no murmur, click, rub or gallop  Abdomen: soft, non-tender.  Bowel sounds normal. No masses,  no organomegaly  Extremities: extremities normal, atraumatic, no cyanosis or edema  Skin: Skin color, texture, turgor normal. No rashes or lesions    Intake and Output:  Current Shift:     Last three shifts:       Lab/Data Reviewed:  CMP:   Lab Results   Component Value Date/Time     11/12/2017 01:40 PM    K 4.2 11/12/2017 01:40 PM     11/12/2017 01:40 PM    CO2 28 11/12/2017 01:40 PM    AGAP 7 11/12/2017 01:40 PM     (H) 11/12/2017 01:40 PM    BUN 17 11/12/2017 01:40 PM    CREA 0.86 11/12/2017 01:40 PM    GFRAA >60 11/12/2017 01:40 PM    GFRNA >60 11/12/2017 01:40 PM    CA 9.5 11/12/2017 01:40 PM    ALB 3.0 (L) 11/12/2017 01:40 PM    TP 8.4 (H) 11/12/2017 01:40 PM    GLOB 5.4 (H) 11/12/2017 01:40 PM    AGRAT 0.6 (L) 11/12/2017 01:40 PM    SGOT 18 11/12/2017 01:40 PM    ALT 16 11/12/2017 01:40 PM     CBC:   Lab Results   Component Value Date/Time    WBC 15.3 (H) 11/12/2017 01:40 PM    HGB 9.0 (L) 11/12/2017 01:40 PM    HCT 29.6 (L) 11/12/2017 01:40 PM     (H) 11/12/2017 01:40 PM     All Cardiac Markers in the last 24 hours:   Lab Results   Component Value Date/Time     11/12/2017 01:40 PM    CKMB 2.7 11/12/2017 01:40 PM    CKND1 2.0 11/12/2017 01:40 PM TROIQ <0.02 11/12/2017 01:40 PM       Chest X-Ray is obtained; CXR reviewed independently -LLL infiltrate    Rafiq Bird MD    November 12, 2017

## 2017-11-12 NOTE — ED NOTES
Pt brought to floor with IV fluids running at 125 mL/hr and vancomycin @ 125 mL/hr. Nurse to call with questions per hospital policy.

## 2017-11-12 NOTE — PROGRESS NOTES
Pt received to unit via stretcher accompanied by Dionisiontcristofer Tessa (daughter), juancarlos, and Zohaib Talamantes RN (ER dept). She is nonverbal. Patient is contracted positioned on left side. Behavior indicators negative. No s/s of distress noted. Annie and juancarlos oriented to room and use of call bell for assistance; both voiced understanding. Bed locked in low position. Call bell in reach. See Admission assessment for further findings.

## 2017-11-12 NOTE — PROGRESS NOTES
Pharmacy Dosing Services: Vancomycin    Indication: PNA    Day of therapy: 0    Other Antimicrobials (Include dose, start day & day of therapy):  Aztreonam 2 gm IV every 8 hours (started )  Clindamycin 600 mg IV every 8 hours (started )      Loading dose (date given): 1250 mg  Current Maintenance dose: none at this time    Goal Vancomycin Level: 15-20  (Trough 15-20 for most infections, 20 for meningitis/osteomyelitis, pre-HD level ~25)    Vancomycin Level (if drawn): none at this itme     Significant Cultures:    Blood - pending   Sputum - pending   Urine - pending    Renal function stable? (unstable defined as SCr increase of 0.5 mg/dL or > 50% increase from baseline, whichever is greater) (Y/N): Y     CAPD, Hemodialysis or Renal Replacement Therapy (Y/N): N     Recent Labs      17   1340   CREA  0.86   BUN  17   WBC  15.3*     Temp (24hrs), Av.7 °F (36.5 °C), Min:97.7 °F (36.5 °C), Max:97.7 °F (36.5 °C)    Creatinine Clearance (Creatinine Clearance (ml/min)): 33-37 ml/min     Regimen assessment: New vancomycin consult  Maintenance dose: 750 mg IV every 24 hours for an extrapolated trough of 15 and AUC > 400  Next scheduled level: 11/15 before 1600 dose       Pharmacy will follow daily and adjust medications as appropriate for renal function and/or serum levels.     Thank you,  Chano Cadet, PHARMD

## 2017-11-12 NOTE — ED PROVIDER NOTES
HPI Comments: 1:37 PM Alberta Blanchard is a 81 y.o. female w/ PMHx of End-Stage Alzheimer's, dementia, CAD, COPD, and HLD who presents to the ED for evaluation of respiratory distress PTA. Pt was brought to the ED via EMS from a nursing home. Per pt's niece, the pt was eating when she began to have trouble breathing \"all of a sudden\" with an unproductive cough. Per EMS, pt had an oxygen saturation in the low-70's on nasal canula prior to their arrival and was put on C-Pap while en route to the ED. Per pt's niece, the pt is nonverbal, does not converse at baseline, and is on a pureed diet. HPI is limited due to acuity of condition. The history is provided by the patient and the spouse. Past Medical History:   Diagnosis Date    [facility]ism ([facility] [address]     Alzheimer's dementia     CAD (coronary artery disease)     COPD     DEMENTIA     High cholesterol     Hyperammonemia ([facility] [address]     Hypertension        No past surgical history on file. No family history on file. Social History     Social History    Marital status:      Spouse name: N/A    Number of children: N/A    Years of education: N/A     Occupational History    Not on file. Social History Main Topics    Smoking status: Former Smoker    Smokeless tobacco: Not on file    Alcohol use Yes    Drug use: Not on file    Sexual activity: Not on file     Other Topics Concern    Not on file     Social History Narrative         ALLERGIES: Cephalexin and Skelaxin [metaxalone]    Review of Systems   Unable to perform ROS: Acuity of condition       Vitals:    11/12/17 1330 11/12/17 1349 11/12/17 1432   BP: (!) 120/101 104/54    Pulse: (!) 101 96    Resp: 29 20    SpO2: 100% 99%    Weight:   40.8 kg (90 lb)   Height:   4' 8\" (1.422 m)            Physical Exam   Constitutional: She is oriented to person, place, and time. She appears well-developed and well-nourished. No distress. Contracted   HENT:   Head: Normocephalic and atraumatic. Mouth/Throat: Oropharynx is clear and moist.   Eyes: Conjunctivae and EOM are normal. Pupils are equal, round, and reactive to light. No scleral icterus. Neck: Normal range of motion. Neck supple. Cardiovascular: Normal rate, regular rhythm and normal heart sounds. No murmur heard. Pulmonary/Chest: Accessory muscle usage (minimal) present. No respiratory distress. She has rhonchi (Coarse rhonchi on right greater than on left). Abdominal: Soft. Bowel sounds are normal. She exhibits no distension. There is no tenderness. Musculoskeletal: She exhibits no edema. Lower extremities contracted   Lymphadenopathy:     She has no cervical adenopathy. Neurological: She is alert and oriented to person, place, and time. Coordination normal.   Skin: Skin is warm and dry. No rash noted. Psychiatric: She has a normal mood and affect. Her behavior is normal.   Minimally verbal to painful stimuli   Nursing note and vitals reviewed.        MDM  Number of Diagnoses or Management Options  Aspiration into airway, initial encounter:   Dementia in Alzheimer's disease:   Diagnosis management comments: H/o advanced dementia with acute onset of respiratory distress while eating cpap per ems once suctioned in ED pt improved tolerating NC no distress + course rhonchi Right pt min verbal to painful stimuli contracted chronically bedridden  H/o DNR for advanced dementia honored in ED     Ekg: sinus tach rate 106 Poor r wave progression     Will leukocytosis and possible aspiration pneumonia code sepsis called 2:37 PM pt has received initially 1 liter bolus however unclear if wt is accurate added 500cc bolus for resuscitation abx ordered     Care transferred to Dr Deep Dumont for admission        Amount and/or Complexity of Data Reviewed  Clinical lab tests: ordered and reviewed  Tests in the radiology section of CPT®: ordered and reviewed  Independent visualization of images, tracings, or specimens: yes    Risk of Complications, Morbidity, and/or Mortality  Presenting problems: high  Diagnostic procedures: moderate    Patient Progress  Patient progress: improved    ED Course       CRITICAL CARE (ASAP ONLY)  Performed by: Christophe Ngo  Authorized by: Christophe Ngo     Critical care provider statement:     Critical care time (minutes):  30    Critical care was necessary to treat or prevent imminent or life-threatening deterioration of the following conditions:  Circulatory failure and respiratory failure    Critical care was time spent personally by me on the following activities:  Blood draw for specimens, ordering and performing treatments and interventions, ordering and review of laboratory studies, ordering and review of radiographic studies, pulse oximetry, discussions with primary provider, development of treatment plan with patient or surrogate, evaluation of patient's response to treatment, re-evaluation of patient's condition, obtaining history from patient or surrogate and examination of patient    I assumed direction of critical care for this patient from another provider in my specialty: no          Vitals:  Patient Vitals for the past 12 hrs:   Pulse Resp BP SpO2   11/12/17 1349 96 20 104/54 99 %   11/12/17 1330 (!) 101 29 (!) 120/101 100 %       Medications Ordered:  Medications   sodium chloride 0.9 % bolus infusion 1,000 mL (1,000 mL IntraVENous New Bag 11/12/17 1347)   clindamycin phosphate (CLEOCIN) 600 mg in 0.9% sodium chloride (MBP/ADV) 100 mL ADV (not administered)   vancomycin (VANCOCIN) 1,250 mg in 0.9% sodium chloride 250 mL IVPB (not administered)   aztreonam (AZACTAM) 2 g in sterile water (preservative free) 10 mL IV syringe (not administered)   sodium chloride (NS) flush 5-10 mL (not administered)   sodium chloride 0.9 % bolus infusion 1,224 mL (not administered)   sodium chloride 0.9 % bolus infusion 500 mL (not administered)   0.9% sodium chloride infusion (not administered)       Lab Findings:  Recent Results (from the past 12 hour(s))   EKG, 12 LEAD, INITIAL    Collection Time: 11/12/17  1:28 PM   Result Value Ref Range    Ventricular Rate 106 BPM    Atrial Rate 106 BPM    P-R Interval 124 ms    QRS Duration 90 ms    Q-T Interval 336 ms    QTC Calculation (Bezet) 446 ms    Calculated P Axis 19 degrees    Calculated R Axis -50 degrees    Calculated T Axis 54 degrees    Diagnosis       Sinus tachycardia  Left anterior fascicular block  Minimal voltage criteria for LVH, may be normal variant  Septal infarct , age undetermined  Abnormal ECG  When compared with ECG of 19-AUG-2017 22:04,  No significant change was found     CBC WITH AUTOMATED DIFF    Collection Time: 11/12/17  1:40 PM   Result Value Ref Range    WBC 15.3 (H) 4.6 - 13.2 K/uL    RBC 3.56 (L) 4.20 - 5.30 M/uL    HGB 9.0 (L) 12.0 - 16.0 g/dL    HCT 29.6 (L) 35.0 - 45.0 %    MCV 83.1 74.0 - 97.0 FL    MCH 25.3 24.0 - 34.0 PG    MCHC 30.4 (L) 31.0 - 37.0 g/dL    RDW 17.2 (H) 11.6 - 14.5 %    PLATELET 688 (H) 142 - 420 K/uL    MPV 8.5 (L) 9.2 - 11.8 FL    NEUTROPHILS 85 (H) 40 - 73 %    LYMPHOCYTES 10 (L) 21 - 52 %    MONOCYTES 4 3 - 10 %    EOSINOPHILS 1 0 - 5 %    BASOPHILS 0 0 - 2 %    ABS. NEUTROPHILS 13.1 (H) 1.8 - 8.0 K/UL    ABS. LYMPHOCYTES 1.5 0.9 - 3.6 K/UL    ABS. MONOCYTES 0.5 0.05 - 1.2 K/UL    ABS. EOSINOPHILS 0.1 0.0 - 0.4 K/UL    ABS.  BASOPHILS 0.0 0.0 - 0.06 K/UL    DF AUTOMATED     METABOLIC PANEL, COMPREHENSIVE    Collection Time: 11/12/17  1:40 PM   Result Value Ref Range    Sodium 139 136 - 145 mmol/L    Potassium 4.2 3.5 - 5.5 mmol/L    Chloride 104 100 - 108 mmol/L    CO2 28 21 - 32 mmol/L    Anion gap 7 3.0 - 18 mmol/L    Glucose 128 (H) 74 - 99 mg/dL    BUN 17 7.0 - 18 MG/DL    Creatinine 0.86 0.6 - 1.3 MG/DL    BUN/Creatinine ratio 20 12 - 20      GFR est AA >60 >60 ml/min/1.73m2    GFR est non-AA >60 >60 ml/min/1.73m2    Calcium 9.5 8.5 - 10.1 MG/DL    Bilirubin, total 0.3 0.2 - 1.0 MG/DL    ALT (SGPT) 16 13 - 56 U/L    AST (SGOT) 18 15 - 37 U/L    Alk. phosphatase 104 45 - 117 U/L    Protein, total 8.4 (H) 6.4 - 8.2 g/dL    Albumin 3.0 (L) 3.4 - 5.0 g/dL    Globulin 5.4 (H) 2.0 - 4.0 g/dL    A-G Ratio 0.6 (L) 0.8 - 1.7     CARDIAC PANEL,(CK, CKMB & TROPONIN)    Collection Time: 11/12/17  1:40 PM   Result Value Ref Range     26 - 192 U/L    CK - MB 2.7 <3.6 ng/ml    CK-MB Index 2.0 0.0 - 4.0 %    Troponin-I, Qt. <0.02 0.0 - 0.045 NG/ML   POC LACTIC ACID    Collection Time: 11/12/17  1:41 PM   Result Value Ref Range    Lactic Acid (POC) 1.5 0.4 - 2.0 mmol/L       EKG Interpretation by ED physician:      X-ray, CT or radiology findings or impressions:  XR CHEST PORT   Final Result          Progress notes, consult notes, or additional procedure notes:  2:32 PM: Calling Code Sepsis    3:08 PM: Pt care transferred to Dr. Adrianne Morales, ED provider. History of patient complaint(s), available diagnostic reports and current treatment plan has been discussed thoroughly. Bedside rounding on patient occured : Yes   Intended disposition of patient : Admit  Pending diagnostics reports and/or labs (please list): Awaiting hospitalist acceptance    3:05 PM, 11/12/2017   Consult:  Discussed care with Dr. Lew Combs, hospitalist. Standard discussion; including history of patients chief complaint, available diagnostic results, and treatment course. Agrees to admit. Reevaluation of the patient:   Improved after suctioning of airway however Bp trending down sepsis called fluid bolus and Abx started     Diagnosis:   1. Aspiration into airway, initial encounter    2. Dementia in Alzheimer's disease        Disposition: admit      Follow-up Information     None           Patient's Medications   Start Taking    No medications on file   Continue Taking    ACETAMINOPHEN (TYLENOL) 325 MG TABLET    Take 650 mg by mouth every four (4) hours as needed for Pain or Fever.     ALUM-MAG HYDROXIDE-SIMETH (MAALOX ADVANCED) 200-200-20 MG/5 ML SUSP    Take 30 mL by mouth four (4) times daily as needed. AMINO ACIDS-PROTEIN HYDROLYS (PRO-STAT AWC)  GRAM-KCAL/30 ML LIQD    Take 30 mL by mouth daily. ATORVASTATIN (LIPITOR) 20 MG TABLET    Take 20 mg by mouth daily. ATROPINE 1 % OPHTHALMIC SOLUTION    2 Drops by SubLINGual route every two (2) hours as needed (to dry secretions). CELECOXIB (CELEBREX) 200 MG CAPSULE    Take 200 mg by mouth two (2) times a day. CONJUGATED ESTROGENS (PREMARIN) 0.625 MG TABLET    Take 0.625 mg by mouth. DONEPEZIL (ARICEPT) 23 MG FILM COATED TABLET    Take 23 mg by mouth nightly. ERGOCALCIFEROL (VITAMIN D2) 50,000 UNIT CAPSULE    Take 50,000 Units by mouth daily. ESCITALOPRAM (LEXAPRO) 10 MG TABLET    Take 10 mg by mouth daily. ESTROGEN, CONJUGATED,-MEDROXYPROGESTERONE (PREMPRO) 0.625-2.5 MG PER TABLET    Take 1 Tab by mouth daily. FERROUS SULFATE 325 MG (65 MG IRON) TABLET    Take  by mouth Daily (before breakfast). FEXOFENADINE (ALLEGRA) 180 MG TABLET    Take 60 mg by mouth daily. GUAIFENESIN (ROBAFEN) 100 MG/5 ML LIQUID    Take 300 mg by mouth every four (4) hours as needed for Cough. HALOPERIDOL LACTATE (HALDOL INJECTION)    Take 1 mg by mouth every four (4) hours as needed (agitation). HYDROXYZINE HCL (ATARAX) 25 MG TABLET    Take 25 mg by mouth every six (6) hours as needed for Itching. LACTULOSE (CHRONULAC) 10 GRAM/15 ML SOLUTION    Take 20 g by mouth two (2) times a day. LEVOFLOXACIN (LEVAQUIN) 750 MG TABLET    Take 1 Tab by mouth daily. LOPERAMIDE (IMODIUM) 2 MG CAPSULE    Take 2 mg by mouth every eight (8) hours as needed. LORAZEPAM (INTENSOL) 2 MG/ML CONCENTRATED SOLUTION    Take 0.5 mL by mouth every four (4) hours as needed for Anxiety. Max Daily Amount: 6 mg. MAGNESIUM HYDROXIDE (MENEZES MILK OF MAGNESIA) 400 MG/5 ML SUSPENSION    Take 30 mL by mouth every four (4) hours as needed for Constipation. MELATONIN 3 MG TABLET    Take 3 mg by mouth nightly.     MEMANTINE ER (NAMENDA XR) 28 MG CAPSULE    Take 28 mg by mouth daily. MIRTAZAPINE (REMERON) 15 MG TABLET    Take 7.5 mg by mouth nightly. MORPHINE (ROXANOL) 100 MG/5 ML (20 MG/ML) CONCENTRATED SOLUTION    0.25 mL by SubLINGual route every four (4) hours as needed for Pain. Max Daily Amount: 30 mg.    ONDANSETRON (ZOFRAN ODT) 4 MG DISINTEGRATING TABLET    Take 1 Tab by mouth every eight (8) hours as needed for Nausea. QUETIAPINE (SEROQUEL) 25 MG TABLET    Take 12.5 mg by mouth nightly. RANITIDINE (ZANTAC) 150 MG TABLET    Take 150 mg by mouth daily. RISPERIDONE (RISPERDAL) 0.25 MG TABLET    Take 0.25 mg by mouth two (2) times a day. SENNA-DOCUSATE (SENNA LAXATIVE-STOOL SOFTENER) 8.6-50 MG PER TABLET    Take 1 Tab by mouth two (2) times a day. These Medications have changed    No medications on file   Stop Taking    No medications on file       [facility] acting as a scribe for and in the presence of Clint Dietz MD      November 12, 2017 at 1:37 PM       Provider Attestation:      I personally performed the services described in the documentation, reviewed the documentation, as recorded by the scribe in my presence, and it accurately and completely records my words and actions.  November 12, 2017 at 1:37 PM - Clint Dietz MD

## 2017-11-12 NOTE — IP AVS SNAPSHOT
[address] [place] [address] 
[phone] Patient: Christiane Arizmendi MRN: [id number] OLF:[id number] About your hospitalization You were admitted on:  November 12, 2017 You last received care in the:  [address] NEURO [facility] You were discharged on:  November 18, 2017 Why you were hospitalized Your primary diagnosis was:  Pna (Pneumonia) Your diagnoses also included:  Alzheimer's Dementia With Behavioral Disturbance, Dysphagia, Debility, Acp (Advance Care Planning), Moderate Protein-Calorie Malnutrition (Hcc), Functional Quadriplegia (Hcc) Things You Need To Do (next 8 weeks) Follow up with Chandan Sawant MD  
  
Phone:  [phone] Where:  [address], [address] [place] [address], [address] [place] [address] Monday Nov 20, 2017 HOSICE AIDE VISIT with Sunita Valladares CNA at  9:00 AM  
Where:  [address] CARE SCHEDULING/INTAKE ([facility]) Tuesday Nov 21, 2017 HOSICE AIDE VISIT with Sunita Valladares CNA at  9:00 AM  
Where:  [address] CARE SCHEDULING/INTAKE ([facility]) Wednesday Nov 22, 2017 HOSICE AIDE VISIT with Sunita Valladares CNA at  9:00 AM  
Where:  [address] CARE SCHEDULING/INTAKE ([facility]) Thursday Nov 23, 2017 HOSICE AIDE VISIT with Sunita Valladares CNA at  9:00 AM  
Where:  [address] CARE SCHEDULING/INTAKE ([facility]) Friday Nov 24, 2017 HOSICE AIDE VISIT with Sunita Valladares CNA at  9:00 AM  
Where:  [address] CARE SCHEDULING/INTAKE ([facility]) Monday Nov 27, 2017 HOSICE AIDE VISIT with Sunita Valladares CNA at  9:00 AM  
Where:  [address] CARE SCHEDULING/INTAKE ([facility]) Tuesday Nov 28, 2017 HOSICE AIDE VISIT with Sunita Valladares CNA at  9:00 AM  
 Where:  [address] CARE SCHEDULING/INTAKE ([facility]) Wednesday Nov 29, 2017 HOSICE AIDE VISIT with Cecilia Andrew CNA at  9:00 AM  
Where:  [address] CARE SCHEDULING/INTAKE ([facility]) Thursday Nov 30, 2017 HOSICE AIDE VISIT with Cecilia Andrew CNA at  9:00 AM  
Where:  [address] CARE SCHEDULING/INTAKE ([facility]) Friday Dec 01, 2017 HOSICE AIDE VISIT with Cecilia Andrew CNA at  9:00 AM  
Where:  [address] CARE SCHEDULING/INTAKE ([facility]) Monday Dec 04, 2017 HOSICE AIDE VISIT with Cecilia Andrew CNA at  9:00 AM  
Where:  [address] CARE SCHEDULING/INTAKE ([facility]) Tuesday Dec 05, 2017 HOSICE AIDE VISIT with Cecilia Andrew CNA at  9:00 AM  
Where:  [address] CARE SCHEDULING/INTAKE ([facility]) Wednesday Dec 06, 2017 HOSICE AIDE VISIT with Cecilia Andrew CNA at  9:00 AM  
Where:  [address] CARE SCHEDULING/INTAKE ([facility]) Thursday Dec 07, 2017 HOSICE AIDE VISIT with Cecilia Andrew CNA at  9:00 AM  
Where:  [address] CARE SCHEDULING/INTAKE ([facility]) Friday Dec 08, 2017 HOSICE AIDE VISIT with Cecilia Andrew CNA at  9:00 AM  
Where:  [address] CARE SCHEDULING/INTAKE ([facility]) Monday Dec 11, 2017 HOSICE AIDE VISIT with Cecilia Andrew CNA at  9:00 AM  
Where:  [address] CARE SCHEDULING/INTAKE ([facility]) Tuesday Dec 12, 2017 HOSICE AIDE VISIT with Cecilia Andrew CNA at  9:00 AM  
Where:  [address] CARE SCHEDULING/INTAKE ([facility]) Wednesday Dec 13, 2017 HOSICE AIDE VISIT with Lyndsay Pac, CNA at  9:00 AM  
Where:  [address] CARE SCHEDULING/INTAKE ([address]) Thursday Dec 14, 2017 HOSICE AIDE VISIT with Lyndsay Pac, CNA at  9:00 AM  
Where:  [address] CARE SCHEDULING/INTAKE ([address]) Friday Dec 15, 2017 HOSICE AIDE VISIT with Lyndsay Pac, CNA at  9:00 AM  
Where:  [address] CARE SCHEDULING/INTAKE ([address]) Monday Dec 18, 2017 HOSICE AIDE VISIT with Lyndsay Pac, CNA at  9:00 AM  
Where:  [address] CARE SCHEDULING/INTAKE ([address]) Tuesday Dec 19, 2017 HOSICE AIDE VISIT with Lyndsay Pac, CNA at  9:00 AM  
Where:  [address] CARE SCHEDULING/INTAKE ([address]) Wednesday Dec 20, 2017 HOSICE AIDE VISIT with Lyndsay Pac, CNA at  9:00 AM  
Where:  [address] CARE SCHEDULING/INTAKE ([address]) Thursday Dec 21, 2017 HOSICE AIDE VISIT with Lyndsay Pac, CNA at  9:00 AM  
Where:  [address] CARE SCHEDULING/INTAKE ([address]) Friday Dec 22, 2017 HOSICE AIDE VISIT with Lyndsay Pac, CNA at  9:00 AM  
Where:  [address] CARE SCHEDULING/INTAKE ([address]) Monday Dec 25, 2017 HOSICE AIDE VISIT with Lyndsay Pac, CNA at  9:00 AM  
Where:  [address] CARE SCHEDULING/INTAKE ([address]) Tuesday Dec 26, 2017 HOSICE AIDE VISIT with Lyndsay Pac, CNA at  9:00 AM  
Where:  [address] CARE SCHEDULING/INTAKE ([address]) Wednesday Dec 27, 2017 HOSICE AIDE VISIT with Lyndsay Pac, CNA at  9:00 AM  
Where:  [address] CARE SCHEDULING/INTAKE ([address]) Thursday Dec 28, 2017 HOSICE AIDE VISIT with Brianquaiyana Doss CNA at  9:00 AM  
Where:  [address] CARE SCHEDULING/INTAKE ([facility]) Friday Dec 29, 2017 HOSICE AIDE VISIT with Brianquaiyana Doss CNA at  9:00 AM  
Where:  [address] CARE SCHEDULING/INTAKE ([facility]) Monday Jan 01, 2018 HOSICE AIDE VISIT with Edgar Doss CNA at  9:00 AM  
Where:  [address] CARE SCHEDULING/INTAKE ([facility]) Tuesday Jan 02, 2018 HOSICE AIDE VISIT with Edgar Doss CNA at  9:00 AM  
Where:  [address] CARE SCHEDULING/INTAKE ([facility]) Wednesday Jan 03, 2018 HOSICE AIDE VISIT with Brianquaiyana Doss CNA at  9:00 AM  
Where:  [address] CARE SCHEDULING/INTAKE ([facility]) Thursday Jan 04, 2018 HOSICE AIDE VISIT with Edgar Doss CNA at  9:00 AM  
Where:  [address] CARE SCHEDULING/INTAKE ([facility]) Friday Jan 05, 2018 HOSICE AIDE VISIT with Brianquaiyana Doss CNA at  9:00 AM  
Where:  [address] CARE SCHEDULING/INTAKE ([facility]) Monday Jan 08, 2018 HOSICE AIDE VISIT with Brianquaiyana Doss CNA at  9:00 AM  
Where:  [address] CARE SCHEDULING/INTAKE ([facility]) Tuesday Jan 09, 2018 HOSICE AIDE VISIT with Edgar Doss CNA at  9:00 AM  
Where:  [address] CARE SCHEDULING/INTAKE ([place] [facility]) Wednesday Denzel 10, 2018 HOSICE AIDE VISIT with Brianquaiyana Doss CNA at  9:00 AM  
Where:  [address] CARE SCHEDULING/INTAKE ([facility]) Thursday Jan 11, 2018 HOSICE AIDE VISIT with Jacqulin Selam CNA at  9:00 AM  
Where:  [address] CARE SCHEDULING/INTAKE ([facility]) Friday Jan 12, 2018 [facility] VISIT with Cecilia Andrew CNA at  9:00 AM  
Where:  [address] [facility] SCHEDULING/INTAKE ([facility]) Discharge Orders None A check ana lilia indicates which time of day the medication should be taken. My Medications STOP taking these medications ALLEGRA 180 mg tablet Generic drug:  fexofenadine  
   
  
 atorvastatin 20 mg tablet Commonly known as:  LIPITOR  
   
  
 atropine 1 % ophthalmic solution  
   
  
 ferrous sulfate 325 mg (65 mg iron) tablet  
   
  
 levoFLOXacin 750 mg tablet Commonly known as:  LEVAQUIN  
   
  
 PREMARIN 0.625 mg tablet Generic drug:  conjugated estrogens PREMPRO 0.625-2.5 mg per tablet Generic drug:  estrogen (conjugated)-medroxyPROGESTERone PRO-STAT AWC  gram-kcal/30 mL Liqd Generic drug:  amino acids-protein hydrolys ROBAFEN 100 mg/5 mL liquid Generic drug:  guaiFENesin VITAMIN D2 50,000 unit capsule Generic drug:  ergocalciferol ZANTAC 150 mg tablet Generic drug:  raNITIdine TAKE these medications as instructed Instructions Each Dose to Equal  
 Morning Noon Evening Bedtime ARICEPT 23 mg film coated tablet Generic drug:  donepezil Your last dose was: Your next dose is: Take 23 mg by mouth nightly. 23 mg  
    
   
   
   
  
 CeleBREX 200 mg capsule Generic drug:  celecoxib Your last dose was: Your next dose is: Take 200 mg by mouth two (2) times a day. 200 mg  
    
   
   
   
  
 escitalopram oxalate 10 mg tablet Commonly known as:  Skip  Your last dose was: Your next dose is: Take 10 mg by mouth daily. 10 mg HALDOL INJECTION Your last dose was: Your next dose is: Take 1 mg by mouth every four (4) hours as needed (agitation). 1 mg  
    
   
   
   
  
 hydrOXYzine HCl 25 mg tablet Commonly known as:  ATARAX Your last dose was: Your next dose is: Take 25 mg by mouth every six (6) hours as needed for Itching. 25 mg  
    
   
   
   
  
 lactulose 10 gram/15 mL solution Commonly known as:  Corlis Fabry Your last dose was: Your next dose is: Take 20 g by mouth two (2) times a day. 20 g  
    
   
   
   
  
 loperamide 2 mg capsule Commonly known as:  IMODIUM Your last dose was: Your next dose is: Take 2 mg by mouth every eight (8) hours as needed. 2 mg LORazepam 2 mg/mL concentrated solution Commonly known as:  INTENSOL Your last dose was: Your next dose is: Take 0.5 mL by mouth every four (4) hours as needed for Anxiety. Max Daily Amount: 6 mg.  
 1 mg MAALOX ADVANCED 200-200-20 mg/5 mL Susp Generic drug:  alum-mag hydroxide-simeth Your last dose was: Your next dose is: Take 30 mL by mouth four (4) times daily as needed. 30 mL  
    
   
   
   
  
 melatonin 3 mg tablet Your last dose was: Your next dose is: Take 3 mg by mouth nightly. 3 mg  
    
   
   
   
  
 morphine 100 mg/5 mL (20 mg/mL) concentrated solution Commonly known as:  Ulises Pique Your last dose was: Your next dose is:    
   
   
 0.25 mL by SubLINGual route every four (4) hours as needed for Pain. Max Daily Amount: 30 mg.  
 5 mg NAMENDA XR 28 mg capsule Generic drug:  memantine ER Your last dose was: Your next dose is: Take 28 mg by mouth daily. 28 mg  
    
   
   
   
  
 ondansetron 4 mg disintegrating tablet Commonly known as:  ZOFRAN ODT Your last dose was: Your next dose is: Take 1 Tab by mouth every eight (8) hours as needed for Nausea. 4 mg MENEZES MILK OF MAGNESIA 400 mg/5 mL suspension Generic drug:  magnesium hydroxide Your last dose was: Your next dose is: Take 30 mL by mouth every four (4) hours as needed for Constipation. 30 mL QUEtiapine 25 mg tablet Commonly known as:  SEROquel Your last dose was: Your next dose is: Take 12.5 mg by mouth nightly. 12.5 mg  
    
   
   
   
  
 REMERON 15 mg tablet Generic drug:  mirtazapine Your last dose was: Your next dose is: Take 7.5 mg by mouth nightly. 7.5 mg  
    
   
   
   
  
 RisperDAL 0.25 mg tablet Generic drug:  risperiDONE Your last dose was: Your next dose is: Take 0.25 mg by mouth two (2) times a day. 0.25 mg SENNA LAXATIVE-STOOL SOFTENER 8.6-50 mg per tablet Generic drug:  senna-docusate Your last dose was: Your next dose is: Take 1 Tab by mouth two (2) times a day. 1 Tab TYLENOL 325 mg tablet Generic drug:  acetaminophen Your last dose was: Your next dose is: Take 650 mg by mouth every four (4) hours as needed for Pain or Fever. 650 mg Discharge Instructions DISCHARGE SUMMARY from Nurse PATIENT INSTRUCTIONS: 
 
After general anesthesia or intravenous sedation, for 24 hours or while taking prescription Narcotics: · Limit your activities · Do not drive and operate hazardous machinery · Do not make important personal or business decisions · Do  not drink alcoholic beverages · If you have not urinated within 8 hours after discharge, please contact your surgeon on call. Report the following to your surgeon: 
· Excessive pain, swelling, redness or odor of or around the surgical area · Temperature over 100.5 · Nausea and vomiting lasting longer than 4 hours or if unable to take medications · Any signs of decreased circulation or nerve impairment to extremity: change in color, persistent  numbness, tingling, coldness or increase pain · Any questions What to do at Home: 
Recommended activity: Bedrest 
 
If you experience any of the following symptoms as listed in discharge teaching as \"When to call for help\", please follow up with your primary care physician and/or call 911. *  Please give a list of your current medications to your Primary Care Provider. *  Please update this list whenever your medications are discontinued, doses are 
    changed, or new medications (including over-the-counter products) are added. *  Please carry medication information at all times in case of emergency situations. These are general instructions for a healthy lifestyle: No smoking/ No tobacco products/ Avoid exposure to second hand smoke Surgeon General's Warning:  Quitting smoking now greatly reduces serious risk to your health. Obesity, smoking, and sedentary lifestyle greatly increases your risk for illness A healthy diet, regular physical exercise & weight monitoring are important for maintaining a healthy lifestyle You may be retaining fluid if you have a history of heart failure or if you experience any of the following symptoms:  Weight gain of 3 pounds or more overnight or 5 pounds in a week, increased swelling in our hands or feet or shortness of breath while lying flat in bed. Please call your doctor as soon as you notice any of these symptoms; do not wait until your next office visit. Recognize signs and symptoms of STROKE: 
 
F-face looks uneven A-arms unable to move or move unevenly S-speech slurred or non-existent T-time-call 911 as soon as signs and symptoms begin-DO NOT go Back to bed or wait to see if you get better-TIME IS BRAIN. Warning Signs of HEART ATTACK Call 911 if you have these symptoms: 
? Chest discomfort. Most heart attacks involve discomfort in the center of the chest that lasts more than a few minutes, or that goes away and comes back. It can feel like uncomfortable pressure, squeezing, fullness, or pain. ? Discomfort in other areas of the upper body. Symptoms can include pain or discomfort in one or both arms, the back, neck, jaw, or stomach. ? Shortness of breath with or without chest discomfort. ? Other signs may include breaking out in a cold sweat, nausea, or lightheadedness. Don't wait more than five minutes to call [address]! Fast action can save your life. Calling 911 is almost always the fastest way to get lifesaving treatment. Emergency Medical Services staff can begin treatment when they arrive  up to an hour sooner than if someone gets to the hospital by car. The discharge information has been reviewed with the patient and guardian. The patient and guardian verbalized understanding. Discharge medications reviewed with the patient and caregiver and appropriate educational materials and side effects teaching were provided. ___________________________________________________________________________________________________________________________________ Pneumonia: Care Instructions Your Care Instructions Pneumonia is an infection of the lungs. Most cases are caused by infections from bacteria or viruses. Pneumonia may be mild or very severe. If it is caused by bacteria, you will be treated with antibiotics. It may take a few weeks to a few months to recover fully from pneumonia, depending on how sick you were and whether your overall health is good. Follow-up care is a key part of your treatment and safety. Be sure to make and go to all appointments, and call your doctor if you are having problems.  It's also a good idea to know your test results and keep a list of the medicines you take. How can you care for yourself at home? · Take your antibiotics exactly as directed. Do not stop taking the medicine just because you are feeling better. You need to take the full course of antibiotics. · Take your medicines exactly as prescribed. Call your doctor if you think you are having a problem with your medicine. · Get plenty of rest and sleep. You may feel weak and tired for a while, but your energy level will improve with time. · To prevent dehydration, drink plenty of fluids, enough so that your urine is light yellow or clear like water. Choose water and other caffeine-free clear liquids until you feel better. If you have kidney, heart, or liver disease and have to limit fluids, talk with your doctor before you increase the amount of fluids you drink. · Take care of your cough so you can rest. A cough that brings up mucus from your lungs is common with pneumonia. It is one way your body gets rid of the infection. But if coughing keeps you from resting or causes severe fatigue and chest-wall pain, talk to your doctor. He or she may suggest that you take a medicine to reduce the cough. · Use a vaporizer or humidifier to add moisture to your bedroom. Follow the directions for cleaning the machine. · Do not smoke or allow others to smoke around you. Smoke will make your cough last longer. If you need help quitting, talk to your doctor about stop-smoking programs and medicines. These can increase your chances of quitting for good. · Take an over-the-counter pain medicine, such as acetaminophen (Tylenol), ibuprofen (Advil, Motrin), or naproxen (Aleve). Read and follow all instructions on the label. · Do not take two or more pain medicines at the same time unless the doctor told you to. Many pain medicines have acetaminophen, which is Tylenol. Too much acetaminophen (Tylenol) can be harmful.  
· If you were given a spirometer to measure how well your lungs are working, use it as instructed. This can help your doctor tell how your recovery is going. · To prevent pneumonia in the future, talk to your doctor about getting a flu vaccine (once a year) and a pneumococcal vaccine (one time only for most people). When should you call for help? Call 911 anytime you think you may need emergency care. For example, call if: 
? · You have severe trouble breathing. ?Call your doctor now or seek immediate medical care if: 
? · You cough up dark brown or bloody mucus (sputum). ? · You have new or worse trouble breathing. ? · You are dizzy or lightheaded, or you feel like you may faint. ? Watch closely for changes in your health, and be sure to contact your doctor if: 
? · You have a new or higher fever. ? · You are coughing more deeply or more often. ? · You are not getting better after 2 days (48 hours). ? · You do not get better as expected. Where can you learn more? Go to http://mahamed-marcela.info/. Enter [id number] in the search box to learn more about \"Pneumonia: Care Instructions. \" Current as of: May 12, 2017 Content Version: 11.4 © 8179-9650 [employer]. Care instructions adapted under license by [employer] (which disclaims liability or warranty for this information). If you have questions about a medical condition or this instruction, always ask your healthcare professional. [place] [address] any warranty or liability for your use of this information. [employer] Announcement We are excited to announce that we are making your provider's discharge notes available to you in [employer]. You will see these notes when they are completed and signed by the physician that discharged you from your recent hospital stay.   If you have any questions or concerns about any information you see in [employer], please call the [facility] Department where you were seen or reach out to your Primary Care Provider for more information about your plan of care. Introducing [facility]! Dear Libby Nassar: Thank you for requesting a [employer] account. Our records indicate that you already have an active [employer] account. You can access your account anytime at [email or web address] Did you know that you can access your hospital and ER discharge instructions at any time in Clar[employer]? You can also review all of your test results from your hospital stay or ER visit. Additional Information If you have questions, please visit the Frequently Asked Questions section of the [employer] website at [email or web address] Remember, [employer] is NOT to be used for urgent needs. For medical emergencies, dial 911. Now available from your iPhone and Android! Providers Seen During Your Hospitalization Provider Specialty Primary office phone Janelle Barry MD Emergency Medicine [phone] Daniela Salazar DO Internal Medicine [phone] Juan Broderick MD Family Practice [phone] Anali Boateng MD Internal Medicine [phone] Your Primary Care Physician (PCP) Primary Care Physician Office Phone Office Fax Khoa Nunez [phone] You are allergic to the following Allergen Reactions Cephalexin Hives Skelaxin (Metaxalone) Unknown (comments) Recent Documentation Height Weight Breastfeeding? BMI OB Status Smoking Status 1.422 m 40.8 kg No 20.18 kg/m2 Postmenopausal Former Smoker Emergency Contacts Name Discharge Info Relation Home Work Mobile Keren Oliveira 399 CAREGIVER [3] Daughter [21] [phone] Derik Feldman DISCHARGE CAREGIVER [3] Spouse [3] [phone] [place] CAREGIVER [3] Friend [5] [phone] Patient Belongings The following personal items are in your possession at time of discharge: 
  Dental Appliances: None         Home Medications: None   Jewelry: None  Clothing: None (\"cut off in ER\")    Other Valuables: None Please provide this summary of care documentation to your next provider. Signatures-by signing, you are acknowledging that this After Visit Summary has been reviewed with you and you have received a copy. Patient Signature:  ____________________________________________________________ Date:  ____________________________________________________________  
  
David Patrick Provider Signature:  ____________________________________________________________ Date:  ____________________________________________________________

## 2017-11-13 PROBLEM — E44.0 MODERATE PROTEIN-CALORIE MALNUTRITION (HCC): Status: ACTIVE | Noted: 2017-01-01

## 2017-11-13 PROBLEM — Z71.89 ACP (ADVANCE CARE PLANNING): Status: ACTIVE | Noted: 2017-01-01

## 2017-11-13 PROBLEM — R53.2 FUNCTIONAL QUADRIPLEGIA (HCC): Status: ACTIVE | Noted: 2017-01-01

## 2017-11-13 PROBLEM — R53.81 DEBILITY: Status: ACTIVE | Noted: 2017-01-01

## 2017-11-13 NOTE — PROGRESS NOTES
Problem: Discharge Planning  Goal: *Discharge to safe environment  Outcome: Progressing Towards Goal  Assisted living with hospice

## 2017-11-13 NOTE — PROGRESS NOTES
Bedside report given by VAMSI lowry. Pt in be resting non verbal opens eyes to voice but does not really look or focus on the the person. No distress noted pt on oxygen via nc @ 2L lungs diminished. Assesment completed plan of care for the shift explained to daughter and niece at bedside both verbalizes understanding. Dual skin assessment done with alen pt has redness to hips and sacral area mepilex applied. Pt turned and repositioned at this this time. IVF infusing well, HOB elevated bed low and in locked position call light within reach will continue with the care. 0600- Pt sleeping incontinent of urine bed bath given pt turned and repositioned made comfortable in bed. No changes noted.

## 2017-11-13 NOTE — PROGRESS NOTES
[facility] with pat at [facility] asked her to call dtr, as she had issues with pts care there. She called her in pt'd room.

## 2017-11-13 NOTE — PROGRESS NOTES
Problem: Falls - Risk of  Goal: *Absence of Falls  Document Jon Fall Risk and appropriate interventions in the flowsheet.    Outcome: Progressing Towards Goal  Fall Risk Interventions:       Mentation Interventions: Door open when patient unattended, Bed/chair exit alarm         Elimination Interventions: Call light in reach, Patient to call for help with toileting needs

## 2017-11-13 NOTE — CDMP QUERY
Please clarify if this patient is being treated/managed for:    =>Functional quadriplegia    =>Other Explanation of clinical findings  =>Unable to Determine (no explanation of clinical findings)      REFERENCE: Functional quadriplegia is the inability to use ones limbs and ambulate due to extreme debility or frailty by another medical condition (e.g. dementia, arthritis, contractures, etc.) without physical injury or damage to the spinal cord. Typically the patient requires total care.   The usual findings are: bedridden, inability to turn, unable to feed or groom self, urinary/fecal incontinence, flexion contractures    The medical record reflects the following:    Risk:  81 female with PMH End stage alzheimer's disease    Clinical Indicators: Per  ED note  \" contracted chronically bedridden\"  Resident of SNF    Treatment: Complete nursing care for ADL's, including feeding and repositioning    Please clarify and document your clinical opinion in the progress notes and discharge summary including the definitive and/or presumptive diagnosis, (suspected or probable), related to the above clinical findings. Please include clinical findings supporting your diagnosis. If you DECLINE this query or would like to communicate with [facility], please utilize the \"[facility] message box\" at the TOP of the Progress Note on the right.       Thank you,  Meliza Johns RN [facility]   [phone]

## 2017-11-13 NOTE — PROGRESS NOTES
[facility]   Discharge Planning/ Assessment    Reasons for Intervention: Pt unable to communicate. Spoke with andre nowak. Pt is in memory center at  [facility] assisted living facility with [facility]. They are not happy with [facility]'s lack of response to their call last night. Called pat at the [facility], left message to call me. Family wishes to change hospice agencies. Plan return  To the [facility] with hospice at [place]. New agency will need to deliver dme prior to her return. Pt is bed bound at baseline. Disoriented, has pvt hired sitter 12-6p m-f. Has hospbed, home O2.      High Risk Criteria  [x] Yes  []No   Physician Referral  [] Yes  [x]No        Date    Nursing Referral  [] Yes  [x]No        Date    Patient/Family Request  [] Yes  [x]No        Date       Resources:    Medicare  [x] Yes  []No   Medicaid  [] Yes  []No   No Resources  [] Yes  []No   Private Insurance  [x] Yes  []No    Name/Phone Number    Other  [] Yes  []No        (i.e. Workman's Comp)         Prior Services:    Prior Services  [x] Yes  []No   Home Health  [] Yes  []No   [address] [place]  [] Yes  []No        Number of [address]  [] Yes  []No       Meals on Wheels  [] Yes  []No   Office on Aging  [] Yes  []No   Transportation Services  [] Yes  []No   Nursing Home  [] Yes  []No        Nursing Home Name    [address]  [] Yes  []No        [address] Name    Other hospice      Information Source:      Information obtained from  [x] Patient  [] Parent   [] [address]  [] Child  [] Spouse   [] Significant Other/Partner   [] Friend      [] EMS    [] Nursing Home Chart          [] Other:   Chart Review  [] Yes  []No     Family/Support System:    Patient lives with  [] Alone    [] Spouse   [] Significant Other  [] Children  [] Caretaker   [] Parent  [] Sibling     [x] Other       Other Support System:    Is the patient responsible for care of others  [] Yes [x]No   Information of person caring for patient on  discharge    Managers financial affairs independently  [] Yes  [x]No   If no, explain:      Status Prior to Admission:    Mental Status  [] Awake  [] Alert  [] Oriented  [] Quiet/Calm [] Lethargic/Sedated   [x] Disoriented  [] Restless/Anxious  [] Combative   Personal Care  [] Dependent  [] [address]  [] Requires Assistance   Meal Preparation Ability  [] Independent   [] Standby Assistance   [] Minimal Assistance   [] Moderate Assistance  [] Maximum Assistance     [x] Total Assistance   Chores  [] Independent with Chores   [] N/A Nursing Home Resident   [x] Requires Assistance   Bowel/Bladder  [] Continent  [] Catheter  [] Incontinent  [] Ostomy Self-Care    [] Urine Diversion Self-Care  [] Maximum Assistance     [x] Total Assistance   Number of Persons needed for assistance    DME at home  [] Liz Mckeon  [] Elena Mckeon   [] Commode    [] Bathroom/Grab Bars  [x] Hospital Bed  [] Nebulizer  [] Oxygen           [] Raised Toilet Seat  [] Shower Chair  [] Side Rails for Bed   [] Tub Transfer Bench   [] Onelia Beecham  [] Melvi Shine, Standard      [] Other:   Vendor      Treatment Presently Receiving:    Current Treatments  [] Chemotherapy  [] Dialysis  [] Insulin  [] IVAB [] IVF   [] O2  [] PCA   [] PT   [] RT   [] Tube Feedings   [] Wound Care     Psychosocial Evaluation:    Verbalized Knowledge of Disease Process  [] Patient  [x]Family   Coping with Disease Process  [] Patient  []Family   Requires Further Counseling Coping with Disease Process  [] Patient  []Family     Identified Projected Needs:    Home Health Aid  [] Yes  []No   Transportation  [x] Yes  []No   Education  [] Yes  []No        Specific Education     Financial Counseling  [] Yes  []No   Inability to Care for Self/Will Require 24 hour care  [x] Yes  []No   Pain Management  [] Yes  []No   Home Infusion Therapy  [] Yes  []No   Oxygen Therapy  [x] Yes  []No   DME  [x] Yes  []No   Long Term Care Placement  [] Yes  []No   Rehab  [] Yes  []No   Physical Therapy  [] Yes  []No   Needs Anticipated At This Time  [x] Yes  []No     Intra-Hospital Referral:    [address]  [x] Yes  []No     [] Yes  [x]No   Patient Representative  [] Yes  [x]No   Staff for Teaching Needs  [] Yes  [x]No   Specialty Teaching Needs     Diabetic Educator  [] Yes  [x]No   Referral for Diabetic Educator Needed  [] Yes  [x]No  If Yes, place order for Nutritionist or Diabetic Consult     Tentative Discharge Plan:    Home with No Services  [] Yes  []No   Home with [address]  [] Yes  []No        If Yes, specify type    [address]  [] Yes  []No        If Yes, specify type    Meals on Wheels  [] Yes  []No   Office of Aging  [] Yes  []No   NHP  [] Yes  []No   Return to the [address] [facility]  [] Yes  []No   Rehab Therapy  [] Yes  []No   Acute Rehab  [] Yes  []No   Subacute Rehab  [] Yes  []No   Private Care  [] Yes  []No   Substance Abuse Referral  [] Yes  []No   Transportation  [] Yes  []No   Chore Service  [] Yes  []No   Inpatient Hospice  [] Yes  []No   OP RT  [] Yes  [] No   OP Hemo  [] Yes  [] No   OP PT  [] Yes  []No   Support Group  [] Yes  []No   Reach to Recovery  [] Yes  []No   OP Oncology Clinic  [] Yes  []No   Clinic Appointment  [] Yes  []No   DME  [] Yes  []No   Comments    Name of D/C Planner or  Given to Patient or Family Humaira lyn rn cm    Phone Number [phone]        Extension    Date 11/13/17   Time    If you are discharged home, whom do you designate to participate in your discharge plan and receive any information needed?      Enter name of designee         Phone # of designee         Address of designee         Updated         Patient refused to designate any           individual

## 2017-11-13 NOTE — PROGRESS NOTES
Nutrition initial assessment/Plan of care      RECOMMENDATIONS:   1. Pureed w/ HTL  2. SF CIB BID  3. Monitor weight and PO intake  4. RD to follow     GOALS:   1. PO intake meets >75% of protein/calorie needs by 11/16  2. Weight Maintenance (+/- 1-2 lb) by 11/20        ASSESSMENT:   Per BMI of 20.2, weight is in the normal classification. However, patient is at nutrition risk since patient is > 72years old with BMI <23 + 10% weight loss in 3 months which is significant. PO intake is suboptimal. Labs noted. Nutrition recommendations listed. RD to follow. Nutrition Diagnoses:   - Difficulty swallowing related to dementia as evidenced by thicken liquid order and patient choking on food. - Inadequate oral intake related to dementia and dysphagia as evidenced by 10% weight loss. Meets Criteria for Acute Malnutrition      [x]Moderate Malnutrition, as evidenced by:   [x] Mild muscle wasting, loss of subcutaneous fat   [x] Nutritional intake <75% of recommended intake for >1 week   [x] Weight loss of 1-2% in 1 week, 5% in 1 month, 7.5% in 3 months, or 10% in 6 months   [] Mild edema        Nutrition Risk:  [x] High  [] Moderate []  Low    SUBJECTIVE/OBJECTIVE:   Pt admitted for PNA. She has h/o End-Stage Alzheimer's, dementia, CAD, COPD, and HLD. Per [facility] home, pt has a choking episode. She lives at the [facility] and she is on a pureed with honey-thick liquids at baseline. Pt has dementia and she is non-verbal. She likes chocolate flavor supplement. UBW is around 100 lb. Information Obtained from:    [x] Chart Review   [] Patient   [] Family/Caregiver   [] Nurse/Physician   [] Interdisciplinary Meeting/Rounds    Diet: Pureed w/ HTL  Medications: [x] Reviewed (Ferrous sulfate, Lactulose, Dextrose5%-0.45%NaCl: 50 ml/hr)    Allergies: [x] Reviewed   Encounter Diagnoses     ICD-10-CM ICD-9-CM   1. Aspiration into airway, initial encounter T17.908A 934.9   2.  Dementia in Alzheimer's disease G30.9 331.0 F02.80 294.10     Past Medical History:   Diagnosis Date    [facility] ([facility] [address]     Alzheimer's dementia     CAD (coronary artery disease)     COPD     DEMENTIA     High cholesterol     Hyperammonemia (HCC)     Hypertension       Labs:    Lab Results   Component Value Date/Time    Sodium 143 11/13/2017 03:50 AM    Potassium 3.9 11/13/2017 03:50 AM    Chloride 110 11/13/2017 03:50 AM    CO2 25 11/13/2017 03:50 AM    Anion gap 8 11/13/2017 03:50 AM    Glucose 90 11/13/2017 03:50 AM    BUN 13 11/13/2017 03:50 AM    Creatinine 0.64 11/13/2017 03:50 AM    Calcium 8.7 11/13/2017 03:50 AM    Magnesium 2.0 06/18/2017 09:25 AM    Albumin 3.0 11/12/2017 01:40 PM     Anthropometrics: BMI (calculated): 20.2  Last 3 Recorded Weights in this Encounter    11/12/17 1432   Weight: 40.8 kg (90 lb)      Ht Readings from Last 1 Encounters:   11/12/17 4' 8\" (1.422 m)       IBW: 90 lb %IBW: 100% UBW: 100 lb %UBW: 90%   [x] Weight Loss [] Weight Gain [] Weight Stable    Estimated Nutrition Needs: [x] MSJ  [] Other:  Calories: 9948-9430 kcal Based on:   [x] Actual BW    Protein:   45-55 g Based on:   [x] Actual BW    Fluid:       6743-7061 ml Based on:   [x] Actual BW      [x] No Cultural, Scientology or ethnic dietary need identified.     [] Cultural, Scientology and ethnic food preferences identified and addressed     Wt Status:  [x] Normal (18.6 - 24.9) [] Underweight (<18.5) [] Overweight (25 - 29.9) [] Mild Obesity (30 - 34.9)  [] Moderate Obesity (35 - 39.9) [] Morbid Obesity (40+)   [x] Moderate Malnutrition [] Severe Malnutrition in the context of :     Nutrition Problems Identified:   [x] Suboptimal PO intake   [] Food Allergies  [x] Difficulty chewing/swallowing/poor dentition  [] Constipation/Diarrhea   [] Nausea/Vomiting   [] None  [] Other:     Plan:   [x] Therapeutic Diet  []  Obtained/adjusted food preferences/tolerances and/or snacks options   [x]  Supplements added   [x] Occupational therapy following for feeding techniques  []  HS snack added   []  Modify diet texture   []  Modify diet for food allergies   []  Educate patient   []  Assist with menu selection   [x]  Monitor PO intake on meal rounds   [x]  Continue inpatient monitoring and intervention   []  Participated in discharge planning/Interdisciplinary rounds/Team meetings   []  Other:     Education Needs:   [x] Not appropriate for teaching at this time due to: non communicative   [] Identified and addressed    Nutrition Monitoring and Evaluation:  [x] Continue ongoing monitoring and intervention  [] Other    Syl Conley, RACIEL  Pager: [phone]

## 2017-11-13 NOTE — PROGRESS NOTES
Problem: Dysphagia (Adult)  Goal: *Acute Goals and Plan of Care (Insert Text)  Recommendations:  Diet: puree/honey-thick liquids  Meds: crushed  Aspiration Precautions  Oral Care TID    Goals:  Patient will:  1. Tolerate PO trials with 0 s/s overt distress in 4/5 trials  2. Utilize compensatory swallow strategies/maneuvers (decrease bite/sip, size/rate, alt. liq/sol) with min cues in 4/5 trials    Outcome: Progressing Towards Goal    Speech LAnguage Pathology bedside swallow   evaluation & TREATMENT     Patient: Madeleine Wheatley (31 y.o. female)  Date: 11/13/2017  Primary Diagnosis: PNA (pneumonia)        Precautions: aspiration       ASSESSMENT :  Based on the objective data described below, the patient presents with moderate oropharyngeal dysphagia in the setting of cognitive decline/dementia. Pt alert but primarily nonverbal. SLP was able to get a few context appropriate yes/no questions and simple direction following from pt during evaluation. Spoke with dtr who reports pt on puree/honey-thick liquid diet at baseline. Today, SLP fed pt ~2 oz of honey-thick orange juice and ~1 oz of applesauce; all via tsp presentations. Pt with adequate acceptance of trials with mildly labored propulsion. Pt does require 1:1 cues for swallow initiation; (+) response to verbal/auditory cues. No aspiration s/s noted. SLP recommending puree/honey-thick liquid diet. Pt MUST be fed at slow rate, with HOB upright and provided 1:1 cues for swallow. Skilled therapy initiated with verbal/auditory cues (of SLP's loud swallow) for pt to initiate timely swallow prior to receiving next bite. (+) response to cueing. (+) verbal feedback provided. SLP educated dtr on results and recommendations for swallowing cues; verbalized comprehension. SLP will follow pt 1-2 visits for diet tolerance and education. Patient will benefit from skilled intervention to address the above impairments.   Patients rehabilitation potential is considered to be Guarded  Factors which may influence rehabilitation potential include:   []            None noted  [x]            Mental ability/status  []            Medical condition  []            Home/family situation and support systems  []            Safety awareness  []            Pain tolerance/management  []            Other:      PLAN :  Recommendations and Planned Interventions:  Puree/honey-thick liquids  Frequency/Duration: Patient will be followed by speech-language pathology 1-2 visits to address goals. Discharge Recommendations: Skilled Nursing Facility     SUBJECTIVE:   Patient stated Seema Garcia. OBJECTIVE:     Past Medical History:   Diagnosis Date    [facility]ism ([facility] [address]     Alzheimer's dementia     CAD (coronary artery disease)     COPD     DEMENTIA     High cholesterol     Hyperammonemia (HCC)     Hypertension    No past surgical history on file. Prior Level of Function/Home Situation: SNF  Home Situation  Home Environment: [address] [place] [address] Name: Brenda  One/Two Story Residence: Two story  Living Alone: No (pt lives in nursing home)  Support Systems: Child(elpidio)  Patient Expects to be Discharged to[de-identified] Skilled nursing facility  Current DME Used/Available at Home: Other (comment) (wheeelchair)  Diet prior to admission: Puree/honey-thick liquids   Current Diet:  Puree/honey-thick liquids   Cognitive and Communication Status:  Neurologic State: Alert, Confused  Orientation Level: Unable to verbalize  Cognition: Decreased command following  Perception: Appears intact  Perseveration: No perseveration noted  Safety/Judgement: Fall prevention  Oral Assessment:  Oral Assessment  Labial: Decreased rate  Dentition: Intact  Oral Hygiene: good  Lingual: Decreased rate  Velum: No impairment  Mandible: No impairment  P.O. Trials:  Patient Position: HOB 60  Vocal quality prior to P.O.: Low volume  Consistency Presented: Puree; Honey thick liquid  How Presented: SLP-fed/presented;Spoon     Bolus Acceptance: No impairment  Bolus Formation/Control: Impaired  Type of Impairment: Delayed;Mastication     Oral Residue: None  Initiation of Swallow: Delayed (# of seconds)  Laryngeal Elevation: Functional  Aspiration Signs/Symptoms: None  Pharyngeal Phase Characteristics: Poor endurance  Effective Modifications: Small sips and bites  Cues for Modifications: Maximal       Oral Phase Severity: Mild  Pharyngeal Phase Severity : Moderate    GCODESwallowing:  Swallow Current Status CL= 60-79%   Swallow Goal Status CK= 40-59%    The severity rating is based on the following outcomes:  SIERRA Noms Swallow Level  3    Clinical Judgement    PAIN:  Start of Eval/Tx: 0  End of Eval/Tx: 0     After treatment:   []            Patient left in no apparent distress sitting up in chair  []            Patient left in no apparent distress in bed  []            Call bell left within reach  []            Nursing notified  []            Family present  []            Caregiver present  []            Bed alarm activated    COMMUNICATION/EDUCATION:   [x]            Aspiration precautions; swallow safety; compensatory techniques. [x]            Patient/family have participated as able in goal setting and plan of care. []            Patient/family agree to work toward stated goals and plan of care. []            Patient understands intent and goals of therapy; neutral about participation. [x]            Patient unable to participate in goal setting/plan of care; educ ongoing with family & interdisciplinary staff  []         Posted safety precautions in patient's room.     Thank you for this referral.  Natasha Srivastava, SLP  Evaluation Time Calculation: 10 mins  Treatment Time: 10 minutes

## 2017-11-13 NOTE — ROUTINE PROCESS
Bedside and Verbal shift change report given to [address] (oncoming nurse) by Mila Bo, RN, BSN (offgoing nurse). Report given with SBAR, Kardex, Intake/Output, MAR and Recent Results.

## 2017-11-13 NOTE — PROGRESS NOTES
Problem: Falls - Risk of  Goal: *Absence of Falls  Document Jon Fall Risk and appropriate interventions in the flowsheet.   Fall Risk Interventions:                               Problem: Pneumonia: Day 1  Goal: *Tolerating diet  Outcome: Not Progressing Towards Goal  Pt NPO at the moment

## 2017-11-13 NOTE — CDMP QUERY
Please clarify if this patient is being treated/managed for:    =>moderate protein calorie malnutrition related to feeding difficulties and Alzheimer's disease    =>Other Explanation of clinical findings  =>Unable to Determine (no explanation of clinical findings)    The medical record reflects the following:    Risk: 82 yo female with advanced Alzheimer's disease    Clinical Indicators: Per Dietician Assessment 11/13  Moderate Malnutrition, as evidenced by:                         Mild muscle wasting, loss of subcutaneous fat                         Nutritional intake <75% of recommended intake for >1 week                         Weight loss of 1-2% in 1 week, 5% in 1 month, 7.5% in 3 months, or 10% in 6 months  patient is at nutrition risk since patient is > 72years old with BMI <23 + 10% weight loss in 3 months which is significant. PO intake is suboptimal    Treatment:  1. Pureed w/ HTL  2. SF CIB BID  3. Monitor weight and PO intake  4. RD to follow     Please clarify and document your clinical opinion in the progress notes and discharge summary including the definitive and/or presumptive diagnosis, (suspected or probable), related to the above clinical findings. Please include clinical findings supporting your diagnosis. If you DECLINE this query or would like to communicate with [facility], please utilize the \"[facility] message box\" at the TOP of the Progress Note on the right.       Thank you,  Toni Major RN [facility]  [phone]

## 2017-11-13 NOTE — PROGRESS NOTES
Care Management Interventions  PCP Verified by CM: Yes  Palliative Care Criteria Met (RRAT>21 & CHF Dx)?: No  Mode of Transport at Discharge:  Other (see comment)  Transition of Care Consult (CM Consult): Karina: Yes  Discharge Durable Medical Equipment: No  Physical Therapy Consult: No  Occupational Therapy Consult: No  Speech Therapy Consult: No  Current Support Network: Assisted Living  Confirm Follow Up Transport: Other (see comment)  Plan discussed with Pt/Family/Caregiver: Yes  Freedom of Choice Offered: Yes  Discharge Location  Discharge Placement: Home with hospice

## 2017-11-13 NOTE — PROGRESS NOTES
Care Management Interventions  PCP Verified by CM: Yes  Palliative Care Criteria Met (RRAT>21 & CHF Dx)?: No  Mode of Transport at Discharge:  Other (see comment)  Transition of Care Consult (CM Consult): Karina: Yes  Discharge Durable Medical Equipment: No  Physical Therapy Consult: No  Occupational Therapy Consult: No  Speech Therapy Consult: No  Current Support Network: Assisted Living  Confirm Follow Up Transport: Other (see comment)  Plan discussed with Pt/Family/Caregiver: Yes  Freedom of Choice Offered: Yes  Discharge Location  Discharge Placement: Home with home health

## 2017-11-13 NOTE — PROGRESS NOTES
conducted an initial consultation and Spiritual Assessment for [place], who is a 81 y.o.,female. Patients Primary Language is: Moi Mehdigabby. According to the patients EMR Sikhism Affiliation is: Penelope Rosario. The reason the Patient came to the hospital is:   Patient Active Problem List    Diagnosis Date Noted    PNA (pneumonia) 11/12/2017    Encephalopathy 08/20/2017    HCAP (healthcare-associated pneumonia) 08/20/2017    Anemia 08/20/2017    Dementia in Alzheimer's disease 08/20/2017    Thrombocytosis ([facility] [address] 08/20/2017    Dysphagia 08/20/2017    MAG (acute kidney injury) ([facility] [address] 12/17/2016    Falls 12/17/2016    Dehydration 12/17/2016    Alzheimer's dementia with behavioral disturbance 12/17/2016        The  provided the following Interventions:   attempted to Initiate a relationship of care and support with patient in room 2106 today. Found patient to be frail and confused and unable to have a conversation. Patient did not seem to understand. Provided information about Spiritual Care Services. Offered prayer and assurance of continued prayers on patients behalf. .    The following outcomes were achieved:  Patient shared limited information about her medical narrative and spiritual journey/beliefs. Patient processed feeling about current hospitalization. Patient expressed gratitude for pastoral care visit. Assessment:  Patient does not have any Orthodoxy/cultural needs that will affect patients preferences in health care. There are no further spiritual or Orthodoxy issues which require Spiritual Care Services interventions at this time. Plan:  Chaplains will continue to follow and will provide pastoral care on an as needed/requested basis    . [facility]   [phone]

## 2017-11-13 NOTE — PROGRESS NOTES
Patient has designated _____________unable___________ to participate in his/her discharge plan and to receive any needed information.      Name:   Address:  Phone number:

## 2017-11-13 NOTE — PROGRESS NOTES
Assumed care of pt from night nurse Tracy Paz RN. Received with patient in bed, with eyes open, nonverbal.  Patient does not have any signs of pain and/or discomfort. Call light in reach. Bed locked in lowest position. Will continue to monitor patient.

## 2017-11-13 NOTE — ROUTINE PROCESS
Bedside and Verbal shift change report given to  Melia Monreal RN (oncoming nurse) by Tessa Dewitt RN (offgoing nurse). Report included the following information SBAR, Kardex, Intake/Output, MAR and Recent Results.

## 2017-11-13 NOTE — PROGRESS NOTES
[facility]  Hospitalist Division        Inpatient Daily Progress Note    Daily progress Note    Patient: Indu Ordaz MRN: [id number]  CSN: [id number]    YOB: 1935  Age: 81 y.o. Sex: female    DOA: 11/12/2017 LOS:  LOS: 1 day                    Chief Complaint: Pneumonia         Subjective: 82 y/o  female with hx of Alzheimer's dementia with behavioral disturbance and dysphagia presented to the ED after a choking episode @[facility] while eating lunch- honey thickened pureed diet. She had to be suctioned, also had low oxygen saturations in EMS. CXR in the ED with developing atelectasis or infiltrate LLL with possible small left effusion, atelectasis on prior RLL appears to have resolved. She was here in August 2017 for pneumonia. Pt was started on Vancomycin and Levaquin. She had speech evaluation with recommendations for puree/honey thick liquid diet. Hospice consulted and spoke with family- they are agreeable to trying hospice again, may consider a different agency if able. Objective:      Visit Vitals    /56 (BP 1 Location: Left arm)    Pulse 88    Temp 98.6 °F (37 °C)    Resp 16    Ht 4' 8\" (1.422 m)    Wt 40.8 kg (90 lb)    SpO2 95%    Breastfeeding No    BMI 20.18 kg/m2           Physical Exam:  General appearance: resting in bed, alert, in no acute distress  Lungs: clear to auscultation bilaterally  Heart: regular rate and rhythm, S1, S2 normal, no murmur, click, rub or gallop  Abdomen: soft, non tender, non distended . Normoactive bowel sounds.  No masses, no organomegaly  Extremities: extremities normal, atraumatic, no cyanosis or edema  Skin: Skin color, texture, turgor normal. No rashes or lesions  PSY: appropriately behaved        Intake and Output:  Current Shift:     Last three shifts:  11/11 1901 - 11/13 0700  In: 640.8 [I.V.:640.8]  Out: 1 [Urine:1]    Recent Results (from the past 24 hour(s)) METABOLIC PANEL, BASIC    Collection Time: 11/13/17  3:50 AM   Result Value Ref Range    Sodium 143 136 - 145 mmol/L    Potassium 3.9 3.5 - 5.5 mmol/L    Chloride 110 (H) 100 - 108 mmol/L    CO2 25 21 - 32 mmol/L    Anion gap 8 3.0 - 18 mmol/L    Glucose 90 74 - 99 mg/dL    BUN 13 7.0 - 18 MG/DL    Creatinine 0.64 0.6 - 1.3 MG/DL    BUN/Creatinine ratio 20 12 - 20      GFR est AA >60 >60 ml/min/1.73m2    GFR est non-AA >60 >60 ml/min/1.73m2    Calcium 8.7 8.5 - 10.1 MG/DL   CBC W/O DIFF    Collection Time: 11/13/17  3:50 AM   Result Value Ref Range    WBC 8.5 4.6 - 13.2 K/uL    RBC 2.93 (L) 4.20 - 5.30 M/uL    HGB 7.3 (L) 12.0 - 16.0 g/dL    HCT 24.6 (L) 35.0 - 45.0 %    MCV 84.0 74.0 - 97.0 FL    MCH 24.9 24.0 - 34.0 PG    MCHC 29.7 (L) 31.0 - 37.0 g/dL    RDW 17.5 (H) 11.6 - 14.5 %    PLATELET 680 (H) 717 - 420 K/uL    MPV 9.0 (L) 9.2 - 11.8 FL           Lab Results   Component Value Date/Time    Glucose 90 11/13/2017 03:50 AM    Glucose 128 11/12/2017 01:40 PM    Glucose 129 08/20/2017 04:00 AM    Glucose 119 08/19/2017 10:00 PM    Glucose 126 06/18/2017 09:25 AM        Imaging:    CXR 11/12/2017  Impression:        1. Developing atelectasis or infiltrate left lower lobe with possible small left  effusion.   2. Atelectasis seen on prior right lower lobe appears to have resolved.       Medication List Reviewed:  Current Facility-Administered Medications   Medication Dose Route Frequency    sodium chloride (NS) flush 5-10 mL  5-10 mL IntraVENous PRN    acetaminophen (TYLENOL) tablet 650 mg  650 mg Oral Q4H PRN    alum-mag hydroxide-simeth (MYLANTA) oral suspension 30 mL  30 mL Oral QID PRN    atorvastatin (LIPITOR) tablet 20 mg  20 mg Oral QHS    donepezil (ARICEPT) film coated tablet 23 mg - PATIENT SUPPLIED (Patient Supplied)  23 mg Oral QHS    escitalopram oxalate (LEXAPRO) tablet 10 mg  10 mg Oral DAILY    ferrous sulfate tablet 325 mg  1 Tab Oral DAILY WITH BREAKFAST    lactulose (CHRONULAC) solution 20 g  20 g Oral BID    magnesium hydroxide (MILK OF MAGNESIA) 400 mg/5 mL oral suspension 30 mL  30 mL Oral Q4H PRN    melatonin tablet 3 mg  3 mg Oral QHS    memantine ER (NAMENDA XR) capsule 28 mg - PATIENT SUPPLIED (Patient Supplied)  28 mg Oral DAILY    raNITIdine (ZANTAC) tablet 150 mg  150 mg Oral DAILY    risperiDONE (RisperDAL) tablet 0.25 mg  0.25 mg Oral BID    levoFLOXacin (LEVAQUIN) 750 mg in D5W IVPB  750 mg IntraVENous Q48H    dextrose 5 % - 0.45% NaCl infusion  50 mL/hr IntraVENous CONTINUOUS    ondansetron (ZOFRAN) injection 4 mg  4 mg IntraVENous Q4H PRN    enoxaparin (LOVENOX) injection 30 mg  30 mg SubCUTAneous Q24H    vancomycin (VANCOCIN) 750 mg in 0.9% sodium chloride (MBP/ADV) 250 mL ADV  750 mg IntraVENous Q24H    [START ON 11/15/2017] VANCOMYCIN INFORMATION NOTE   Other ONCE    Memantine XR 28 mg and donepezil 23 mg - please bring from home  1 Each Other DAILY         Assessment/Plan:     Patient Active Problem List   Diagnosis Code    MAG (acute kidney injury) ([facility] [address] N17.9    Falls [address]. [address]    Dehydration E86.0    Alzheimer's dementia with behavioral disturbance G30.8, F02.81    Encephalopathy G93.40    HCAP (healthcare-associated pneumonia) J18.9    Anemia D64.9    Dementia in Alzheimer's disease G30.9, F02.80    Thrombocytosis (HCC) D47.3    Dysphagia R13.10    PNA (pneumonia) J18.9    Debility R53.81    ACP (advance care planning) Z71.89    Moderate protein-calorie malnutrition (HCC) E44.0    Functional quadriplegia (HCC) R53.2       A/P:  Pneumonia- aspiration vs HCAP? CXR with developing atelectasis or infiltrate left lower lobe with possible small left  Effusion. Continue Vancomycin and Levaquin. Afebrile w/o leukocytosis. Dysphagia- s/p speech eval today-recommend puree/honey-thick liquid diet, pt MUST be fed at slow rate, with HOB upright and provided 1:1 cues for swallow.   Alzheimer's Dementia with behavorial disturbance- Aricept, Lexapro, Namenda, Risperdal  HLD- Atorvastatin 20 mg daily  DVT Prophylaxis- Lovenox 30 mg daily    Pt is DNR/DNI      Achilles Bridegroom, FNP-C  [facility]  Hospitalist Division  Pager:  [phone]  Office:  [phone]

## 2017-11-13 NOTE — PROGRESS NOTES
Met with the pt's dgt, Jan Serrato, to dicussed home hospice care for the pt. She reported that she would like to speak with a nurse from [facility], but was also interested in other agencies. Provided her with a FOC form with a list of hospice agencies. Call to [facility] rep, Matt Lares. Informed her the dgt is only requesting general information at this time about their hospice agency. She reported she would f/u with their nurse liaison, Patricia Nunez. Pt's dgt has chosen [facility] to provide the pt's hospice at [facility] [place], [place]. Their rep, Patricia Nunez reported [phone] must remore their hospice DME before they can have [facility] delivered. She reported the pt's dgt will contact [facility] this afternoon. Andrea Henley reported their plan is to be able to receive the pt at [facility] [phone] pm for start of home hospice care, if dc'd.

## 2017-11-13 NOTE — PROGRESS NOTES
Active Hospital Problems    Diagnosis Date Noted    Debility 11/13/2017    ACP (advance care planning) 11/13/2017    Moderate protein-calorie malnutrition ([facility] [address] 11/13/2017    Functional quadriplegia ([facility] [address] 11/13/2017    PNA (pneumonia) 11/12/2017    Dysphagia 08/20/2017    Alzheimer's dementia with behavioral disturbance 12/17/2016     Bety Tatum,   Internal Medicine, Hospitalist  Pager: [phone]

## 2017-11-13 NOTE — ROUTINE PROCESS
Plan:  To provide an enjoyable diversion. Implementation:  Provided live bedside harp music, oldies per visitor's request.  Evaluation:  Patient is restless, moving legs and picking at bedcovers, and mumbles during music. Visitors delighted with music and feel that patient responded.

## 2017-11-14 NOTE — ROUTINE PROCESS
Bedside and Verbal shift change report given to Sapna Lin by Fatuma Barajas RN. Report included the following information SBAR, Kardex, Intake/Output and MAR.

## 2017-11-14 NOTE — PROGRESS NOTES
[facility]  Hospitalist Division        Inpatient Daily Progress Note    Daily progress Note    Patient: Alba Orozco MRN: [id number]  CSN: [id number]    YOB: 1935  Age: 81 y.o. Sex: female    DOA: 11/12/2017 LOS:  LOS: 2 days                    Chief Complaint: Pneumonia         Subjective: Family has chosen [facility]- she is pending d/c back to [facility] on hospice. Sputum + for MRSA- need to ensure facility will still accept pt. No events overnight. VSS. Family at bedside. Objective:      Visit Vitals    /62 (BP 1 Location: Left arm, BP Patient Position: At rest)    Pulse 97    Temp 98 °F (36.7 °C)    Resp 16    Ht 4' 8\" (1.422 m)    Wt 40.8 kg (90 lb)    SpO2 96%    Breastfeeding No    BMI 20.18 kg/m2           Physical Exam:  General appearance: resting in bed, alert, in no acute distress  Lungs: clear to auscultation bilaterally  Heart: regular rate and rhythm, S1, S2 normal, no murmur, click, rub or gallop  Abdomen: soft, non tender, non distended . Normoactive bowel sounds.   Extremities: extremities normal, atraumatic, no cyanosis or edema  Skin: Skin color, texture, turgor normal. No rashes or lesions  PSY: appropriately behaved        Intake and Output:  Current Shift:  11/14 0701 - 11/14 1900  In: 140 [P.O.:140]  Out: -   Last three shifts:  11/12 1901 - 11/14 0700  In: 2096.7 [I.V.:2096.7]  Out: 1 [Urine:1]    Recent Results (from the past 24 hour(s))   METABOLIC PANEL, BASIC    Collection Time: 11/14/17  4:00 AM   Result Value Ref Range    Sodium 142 136 - 145 mmol/L    Potassium 3.5 3.5 - 5.5 mmol/L    Chloride 109 (H) 100 - 108 mmol/L    CO2 26 21 - 32 mmol/L    Anion gap 7 3.0 - 18 mmol/L    Glucose 90 74 - 99 mg/dL    BUN 14 7.0 - 18 MG/DL    Creatinine 0.66 0.6 - 1.3 MG/DL    BUN/Creatinine ratio 21 (H) 12 - 20      GFR est AA >60 >60 ml/min/1.73m2    GFR est non-AA >60 >60 ml/min/1.73m2    Calcium 8.6 8.5 - 10.1 MG/DL   CBC W/O DIFF    Collection Time: 11/14/17  4:00 AM   Result Value Ref Range    WBC 7.5 4.6 - 13.2 K/uL    RBC 2.92 (L) 4.20 - 5.30 M/uL    HGB 7.4 (L) 12.0 - 16.0 g/dL    HCT 24.6 (L) 35.0 - 45.0 %    MCV 84.2 74.0 - 97.0 FL    MCH 25.3 24.0 - 34.0 PG    MCHC 30.1 (L) 31.0 - 37.0 g/dL    RDW 17.6 (H) 11.6 - 14.5 %    PLATELET 059 661 - 581 K/uL    MPV 8.8 (L) 9.2 - 11.8 FL           Lab Results   Component Value Date/Time    Glucose 90 11/14/2017 04:00 AM    Glucose 90 11/13/2017 03:50 AM    Glucose 128 11/12/2017 01:40 PM    Glucose 129 08/20/2017 04:00 AM    Glucose 119 08/19/2017 10:00 PM        Imaging:    CXR 11/12/2017  Impression:        1. Developing atelectasis or infiltrate left lower lobe with possible small left  effusion.   2. Atelectasis seen on prior right lower lobe appears to have resolved.       Medication List Reviewed:  Current Facility-Administered Medications   Medication Dose Route Frequency    sodium chloride (NS) flush 5-10 mL  5-10 mL IntraVENous PRN    acetaminophen (TYLENOL) tablet 650 mg  650 mg Oral Q4H PRN    alum-mag hydroxide-simeth (MYLANTA) oral suspension 30 mL  30 mL Oral QID PRN    escitalopram oxalate (LEXAPRO) tablet 10 mg  10 mg Oral DAILY    lactulose (CHRONULAC) solution 20 g  20 g Oral BID    magnesium hydroxide (MILK OF MAGNESIA) 400 mg/5 mL oral suspension 30 mL  30 mL Oral Q4H PRN    melatonin tablet 3 mg  3 mg Oral QHS    raNITIdine (ZANTAC) tablet 150 mg  150 mg Oral DAILY    risperiDONE (RisperDAL) tablet 0.25 mg  0.25 mg Oral BID    levoFLOXacin (LEVAQUIN) 750 mg in D5W IVPB  750 mg IntraVENous Q48H    dextrose 5 % - 0.45% NaCl infusion  50 mL/hr IntraVENous CONTINUOUS    ondansetron (ZOFRAN) injection 4 mg  4 mg IntraVENous Q4H PRN    enoxaparin (LOVENOX) injection 30 mg  30 mg SubCUTAneous Q24H    vancomycin (VANCOCIN) 750 mg in 0.9% sodium chloride (MBP/ADV) 250 mL ADV  750 mg IntraVENous Q24H    [START ON 11/15/2017] VANCOMYCIN INFORMATION NOTE   Other ONCE         Assessment/Plan:     Patient Active Problem List   Diagnosis Code    MAG (acute kidney injury) ([facility] [address] N17.9    Falls W19. [place]    Dehydration E86.0    Alzheimer's dementia with behavioral disturbance G30.8, F02.81    Encephalopathy G93.40    HCAP (healthcare-associated pneumonia) J18.9    Anemia D64.9    Dementia in Alzheimer's disease G30.9, F02.80    Thrombocytosis (HCC) D47.3    Dysphagia R13.10    PNA (pneumonia) J18.9    Debility R53.81    ACP (advance care planning) Z71.89    Moderate protein-calorie malnutrition (HCC) E44.0    Functional quadriplegia (HCC) R53.2       A/P:  Pneumonia- sputum culture + MRSA-  CXR with developing atelectasis or infiltrate left lower lobe with possible small left effusion. Continue Vancomycin- will d/c Levaquin. Afebrile w/o leukocytosis. Dysphagia- s/p speech eval today-recommend puree/honey-thick liquid diet, pt MUST be fed at slow rate, with HOB upright and provided 1:1 cues for swallow.   Alzheimer's Dementia with behavorial disturbance- Aricept, Lexapro, Namenda, Risperdal  HLD- Atorvastatin 20 mg daily  DVT Prophylaxis- Lovenox 30 mg daily  Disposition-d/c back to [facility] on hospice- now with MRSA sputum- case management speaking with facility- unable to accept pt until after receiving treatment for MRSA sputum- plan is to not treat since pt is hospice- case management awaiting response from supervisor at [facility]    Pt is DNR/DNI      Sravanthi Cast 15  Pager:  [phone]  Office:  [phone]

## 2017-11-14 NOTE — PROGRESS NOTES
Problem: Dysphagia (Adult)  Goal: *Acute Goals and Plan of Care (Insert Text)  Recommendations:  Diet: puree/honey-thick liquids  Meds: crushed  Aspiration Precautions  Oral Care TID    Goals:  Patient will:  1. Tolerate PO trials with 0 s/s overt distress in 4/5 trials - goal met by feeder  2. Utilize compensatory swallow strategies/maneuvers (decrease bite/sip, size/rate, alt. liq/sol) with min cues in 4/5 trials - goal met by feeder     Outcome: Resolved/Met Date Met: 11/14/17  Speech language pathology dysphagia treatment & discharge    Patient: Madeleine Wheatley (89 y.o. female)  Date: 11/14/2017  Diagnosis: PNA (pneumonia) PNA (pneumonia)       Precautions: aspiration      ASSESSMENT:  Follow-up this am with dtr at b/s. Caregiver reports pt with adequate intake this am for breakfast. SLP re-educated dtr and caregiver re: safe swallow techniques and aspiration precautions. Verbalized comprehension and demo return. Pt currently tolerating puree/HTL (basline diet) without aspiration s/s. Maximum therapeutic gains met; safest, least restrictive diet achieved in current in-patient/acute setting. Accordingly, SLP to d/c intervention at this time. Progression toward goals:  [x]         Improving appropriately - goals met/approximated  []         Not making progress/Not appropriate - will d/c POC     PLAN:  Recommendations and Planned Interventions:  Maximum therapeutic gains met; safest, least restrictive diet achieved. D/C ST intervention at this time. Discharge Recommendations:  Skilled Nursing Facility     SUBJECTIVE:   Patient stated nonverbal.     OBJECTIVE:   Cognitive and Communication Status:  Neurologic State: Alert, Confused  Orientation Level: Unable to verbalize  Cognition: Unable to assess (comment)  Perception: Appears intact  Perseveration: No perseveration noted  Safety/Judgement: Fall prevention  Dysphagia Treatment:  Oral Assessment:  Oral Assessment  Labial: Decreased rate  Dentition: Intact  Oral Hygiene: good  Lingual: Decreased rate  Velum: No impairment  Mandible: No impairment  P.O. Trials:   Patient Position: HOB 60   Vocal quality prior to P.O.: Low volume   Consistency Presented: Puree, Honey thick liquid   How Presented: SLP-fed/presented, Spoon       Bolus Acceptance: No impairment   Bolus Formation/Control: Impaired   Type of Impairment: Delayed, Mastication       Oral Residue: None   Initiation of Swallow: Delayed (# of seconds)   Laryngeal Elevation: Functional   Aspiration Signs/Symptoms: None   Pharyngeal Phase Characteristics: Poor endurance   Effective Modifications: Small sips and bites   Cues for Modifications: Maximal         Oral Phase Severity: Mild   Pharyngeal Phase Severity : Moderate           PAIN:  Start of Tx: 0  End of Tx: 0     After treatment:   []              Patient left in no apparent distress sitting up in chair  [x]              Patient left in no apparent distress in bed  [x]              Call bell left within reach  [x]              Nursing notified  [x]              Caregiver and Family present  []              Bed alarm activated      COMMUNICATION/EDUCATION:   [x] Aspiration precautions; swallow safety; compensatory techniques  [x]        Patient unable to participate in education; education ongoing with family and staff  []  Posted safety precautions in patient's room.   [] Oral-motor/laryngeal strengthening exercises    RUBA Lerner  Time Calculation: 10 mins

## 2017-11-14 NOTE — PROGRESS NOTES
[facility]: [phone] ([phone])  [facility]: [phone]   [facility]: [phone]    Patient Name: Mariaa Winter  YOB: 1935    Date of Initial Consult: 11/13/17  Reason for Consult: care decisions  Requesting Provider: Dr. Shashi Drummond   Primary Care Physician: Lis Cerna MD      SUMMARY:   Mariaa Winter is a 81y.o. year old with a past history of Alzheimer's dementia, dysphagia, pneumonia, anemia , who was admitted on 11/12/2017 from [facility] with a diagnosis of pneumonia. Current medical issues leading to Palliative Medicine involvement include: 80 y/o [facility] resident with advanced dementia, has been with [facility] since January 2017, known dysphagia. Patient developed acute SOB immediately after eating, better after suctioning in ED. Palliative medicine is consulted for goals of care decisions. PALLIATIVE DIAGNOSES:   1. ACP/goals of care discussion  2. pneumonia  3. Alzheimer's dementia  4. Dysphagia  5. debility       PLAN:   1. Pt seen at bedside along with Dr. Gómez Montesinos. The patient is awake but talking to herself, not interacting with caregivers or environment. Daughter Dea White and niece Isaac Del Real are at bedside. The patient is  with 2 children, Annie who lives in [address] [place] [address], who lives in [facility]. Caro Conroy is the MPOA, we do not have a copy of AMD on file. Caro Conroy has been in close contact with her sister about his mother's condition and has been signing necessary forms via fax. Niece Isaac Griderow is also very involved in the patient's care and she resides here in [place]. Per Dea Cindy the patient was first diagnosed with Alzheimer's 11 years ago. She has lived in the memory unit at [facility] for the past 2.5 years and has been on Hospice Since January. Since January she has been bed/WC bound.   Her family notes progression of her illness as she is now non verbal and has been on a dysphagia diet since August.   2. ACP/goals of care discussion: Dayami Michelle and Benja Preston all agree that they want the patient to continue on hospice. She is DNR/DNI,no feeding tube. POST form with DNR, comfort measures, no feeding tube signed by Earline Wilkins. Patient will continue her IV antibiotics while hospitalized with a goal to switch to oral upon discharge. 3.  Pneumonia: Pt on IV antibiotics per hospitalist team.  Pt now with MRSA in sputum, may not be able to return to [facility] as planned. Family is open to continuing IV antibiotics if needed in order to reach eventual goal of comfort care/hospice, ideally at [facility]. Per family Case management is working with hospitalist team to determine best discharge plan. 4. Alzheimer's dementia: Per family, pt  Was diagnosed 11 years ago. She has been residing in a memory unit at [facility] for the past 2.5 years. Her health has declined rapidly in the past year ans she is now bed bound, non verbal, dysphagic/pocketing food, and has had a decreased appetite recently. 5. Dysphagia: Pt was admitted with pneumonia is August 2017 and at that time SLP made recs for puree/nectar thick diet. Speech saw yesterday and recommended puree/honey thick. Family reports that she ate well this morning. Family aware of the risk of aspiration pneumonia. 6. Debility: Pt is dependent for all ADLs, has been bedbound since January. Hospice support to help with ADLs. 7. Initial consult note routed to primary continuity provider  8.  Communicated plan of care with: Palliative IDT, family, provider       GOALS OF CARE:   Comfort, quality of life    Advance Care Planning 11/12/2017   Patient's Healthcare Decision Maker is: Legal Next of David 70   Primary Decision Maker Name Fabian Perry   Primary Decision Maker Phone Number    Primary Decision Maker Relationship to Patient Adult child   Secondary Decision Maker Name -   Secondary Decision [address] Phone Number -   Secondary Decision Maker Relationship to Patient -   Confirm Advance Directive Yes, on file   Does the patient have other document types Do Not Resuscitate           HISTORY:     History obtained from: patient family, chart    CHIEF COMPLAINT: SOB    HPI/SUBJECTIVE: See summary, family signed POST today, case management working on discharge plan. Pulse 80 117/62, 98.0, respirations- 16, last BM yesterday  MAR-  lexapro, lactulose, lovenox, melatonin,  zantac, risperdal, vanco, IVF at 50 ml/hr  SLP-puree/honey thick  Labs- WBC-7.5, H/H- 7.4/24.6, BUN/creatinine- 14/0.66  Sputum culture positive for MRSA  CXR- 11/12/17. Developing atelectasis or infiltrate left lower lobe with possible small left  effusion. 2. Atelectasis seen on prior right lower lobe appears to have resolved.     The patient is:   [] Verbal and participatory  [x] Non-participatory due to:   Advanced dementia    Clinical Pain Assessment (nonverbal scale for nonverbal patients): Pain: 0  Adult Non-Verbal Pain Assessment    Face  [x] 0   No particular expression or smile  [] 1   Occasional grimace, tearing, frowning, wrinkled forehead  [] 2   Frequent grimace, tearing, frowning, wrinkled forehead    Activity (movement)  [x] 0   Lying quietly, normal position  [] 1   Seeking attention through movement or slow, cautious movement  [] 2   Restless, excessive activity and/or withdrawal reflexes    Guarding  [x] 0   Lying quietly, no positioning of hands over areas of body  [] 1   Splinting areas of the body, tense  [] 2   Rigid, stiff    Physiology (vital signs)  [x] 0   Stable vital signs  [] 1   Change in any of the following: SBP > 20mm Hg; HR > 20/minute  [] 2   Change in any of the following: SBP > 30mm Hg; HR > 25/minute    Respiratory  [x] 0   Baseline RR/SpO2, compliant with ventilator  [] 1   RR > 10 above baseline, or 5% drop SpO2, mild asynchrony with ventilator  [] 2   RR > 20 above baseline, or 10% drop SpO2, asynchrony with ventilator    Total Non-Verbal Pain Score: 0 FUNCTIONAL ASSESSMENT:     Palliative Performance Scale (PPS): 30          PSYCHOSOCIAL/SPIRITUAL SCREENING:     Advance Care Planning:  Advance Care Planning 11/12/2017   Patient's Healthcare Decision Maker is: Legal Next of David 70   Primary Decision Maker Name Michelle Moralez   Primary Decision Maker Phone Number    Primary Decision Maker Relationship to Patient Adult child   Secondary Decision Maker Name -   Secondary Decision [address] Phone Number -   Secondary Decision Maker Relationship to Patient -   Confirm Advance Directive Yes, on file   Does the patient have other document types Do Not Resuscitate        Any spiritual / Bahai concerns:  [] Yes /  [x] No    Caregiver Burnout:  [] Yes /  [x] No /  [] No Caregiver Present      Anticipatory grief assessment:   [x] Normal  / [] Maladaptive       ESAS Anxiety:      ESAS Depression:          REVIEW OF SYSTEMS:     Positive and pertinent negative findings in ROS are noted above in HPI. The following systems were [] reviewed / [x] unable to be reviewed as noted in HPI  Other findings are noted below. Systems: constitutional, ears/nose/mouth/throat, respiratory, gastrointestinal, genitourinary, musculoskeletal, integumentary, neurologic, psychiatric, endocrine. Positive findings noted below. Modified ESAS Completed by: provider           Pain: 0           Dyspnea: 0     Constipation: No     Stool Occurrence(s): 1        PHYSICAL EXAM:     Wt Readings from Last 3 Encounters:   11/12/17 40.8 kg (90 lb)   08/19/17 45.4 kg (100 lb)   06/18/17 43.1 kg (95 lb)     Blood pressure 117/62, pulse 97, temperature 98 °F (36.7 °C), resp. rate 16, height 4' 8\" (1.422 m), weight 40.8 kg (90 lb), SpO2 96 %, not currently breastfeeding.   Pain:  Pain Scale 1: Adult Nonverbal Pain Scale  Pain Intensity 1: 0                 Last bowel movement: 11/13/17    Constitutional: frail elderly woman, talking to herself, does not make eye contact  Eyes: pupils equal, anicteric  ENMT: no nasal discharge, moist mucous membranes  Respiratory: breathing not labored, symmetric  Musculoskeletal: no deformity  Skin: warm, dry  Neurologic: does not follow commands         HISTORY:     Principal Problem:    PNA (pneumonia) (11/12/2017)    Active Problems:    Alzheimer's dementia with behavioral disturbance (12/17/2016)      Dysphagia (8/20/2017)      Debility (11/13/2017)      ACP (advance care planning) (11/13/2017)      Moderate protein-calorie malnutrition ([facility] [address] (11/13/2017)      Functional quadriplegia ([address] (11/13/2017)      Past Medical History:   Diagnosis Date    Alcoholism ([facility]     Alzheimer's dementia     CAD (coronary artery disease)     COPD     DEMENTIA     High cholesterol     Hyperammonemia ([facility] [address]     Hypertension       No past surgical history on file. No family history on file. History reviewed, no pertinent family history.   Social History   Substance Use Topics    Smoking status: Former Smoker    Smokeless tobacco: Not on file    Alcohol use Yes     Allergies   Allergen Reactions    Cephalexin Hives    Skelaxin [Metaxalone] Unknown (comments)      Current Facility-Administered Medications   Medication Dose Route Frequency    sodium chloride (NS) flush 5-10 mL  5-10 mL IntraVENous PRN    acetaminophen (TYLENOL) tablet 650 mg  650 mg Oral Q4H PRN    alum-mag hydroxide-simeth (MYLANTA) oral suspension 30 mL  30 mL Oral QID PRN    escitalopram oxalate (LEXAPRO) tablet 10 mg  10 mg Oral DAILY    lactulose (CHRONULAC) solution 20 g  20 g Oral BID    magnesium hydroxide (MILK OF MAGNESIA) 400 mg/5 mL oral suspension 30 mL  30 mL Oral Q4H PRN    melatonin tablet 3 mg  3 mg Oral QHS    raNITIdine (ZANTAC) tablet 150 mg  150 mg Oral DAILY    risperiDONE (RisperDAL) tablet 0.25 mg  0.25 mg Oral BID    dextrose 5 % - 0.45% NaCl infusion  50 mL/hr IntraVENous CONTINUOUS    ondansetron (ZOFRAN) injection 4 mg  4 mg IntraVENous Q4H PRN    enoxaparin (LOVENOX) injection 30 mg  30 mg SubCUTAneous Q24H    vancomycin (VANCOCIN) 750 mg in 0.9% sodium chloride (MBP/ADV) 250 mL ADV  750 mg IntraVENous Q24H    [START ON 11/15/2017] VANCOMYCIN INFORMATION NOTE   Other ONCE        LAB AND IMAGING FINDINGS:     Lab Results   Component Value Date/Time    WBC 7.5 11/14/2017 04:00 AM    HGB 7.4 11/14/2017 04:00 AM    PLATELET 990 32/51/2794 04:00 AM     Lab Results   Component Value Date/Time    Sodium 142 11/14/2017 04:00 AM    Potassium 3.5 11/14/2017 04:00 AM    Chloride 109 11/14/2017 04:00 AM    CO2 26 11/14/2017 04:00 AM    BUN 14 11/14/2017 04:00 AM    Creatinine 0.66 11/14/2017 04:00 AM    Calcium 8.6 11/14/2017 04:00 AM    Magnesium 2.0 06/18/2017 09:25 AM      Lab Results   Component Value Date/Time    AST (SGOT) 18 11/12/2017 01:40 PM    Alk. phosphatase 104 11/12/2017 01:40 PM    Protein, total 8.4 11/12/2017 01:40 PM    Albumin 3.0 11/12/2017 01:40 PM    Globulin 5.4 11/12/2017 01:40 PM     Lab Results   Component Value Date/Time    INR 1.1 08/21/2017 04:10 AM    Prothrombin time 14.0 08/21/2017 04:10 AM      No results found for: IRON, FE, TIBC, IBCT, PSAT, FERR   No results found for: PH, PCO2, PO2  No components found for: [place]   Lab Results   Component Value Date/Time     11/12/2017 01:40 PM    CK - MB 2.7 11/12/2017 01:40 PM              Total time: 35  Counseling / coordination time, spent as noted above: 30  > 50% counseling / coordination?:  Yes, discussion with family, provider, case management    Prolonged service was provided for  []30 min   []75 min in face to face time in the presence of the patient, spent as noted above. Time Start:   Time End:   Note: this can only be billed with 18922 (initial) or 02855 (follow up). If multiple start / stop times, list each separately.

## 2017-11-14 NOTE — PROGRESS NOTES
Problem: Falls - Risk of  Goal: *Absence of Falls  Document Jon Fall Risk and appropriate interventions in the flowsheet. Outcome: Progressing Towards Goal  Fall Risk Interventions:       Mentation Interventions: Adequate sleep, hydration, pain control, Bed/chair exit alarm, Familiar objects from home, Family/sitter at bedside, Gait belt with transfers/ambulation    Medication Interventions: Patient to call before getting OOB, Teach patient to arise slowly    Elimination Interventions:  Toilet paper/wipes in reach, Toileting schedule/hourly rounds

## 2017-11-14 NOTE — PROGRESS NOTES
Spoke with pat from the [facility] they will not take pt back without treat x 7 days. cariled [facility] to see if they  Would take pt if md will treat with antibxs, waiting for call back if pt went to snf for 7 days, with hospice would have to pay rm and board while still paying [facility] and would  Not be able to afford it. Spoke to pts dtr. pt has had 2 days of iv vanco here so far.

## 2017-11-14 NOTE — PROGRESS NOTES
Problem: Pneumonia: Day 2    Goal: Activity/Safety  Outcome: Progressing Towards Goal  Pt bed bound at baseline     Goal: Nutrition/Diet  Outcome: Progressing Towards Goal  Pt tolerating pureed diet/pills crushed with pudding    Goal: Psychosocial  Outcome: Progressing Towards Goal  Pt calm. Incomprehensible speech    Problem: Pressure Injury - Risk of    Goal: *Prevention of pressure ulcer  Outcome: Progressing Towards Goal  Pt turned Q2 hrs per protocol  Problem: Falls - Risk of    Goal: *Absence of Falls  Document Jon Fall Risk and appropriate interventions in the flowsheet.    Outcome: Progressing Towards Goal  Fall Risk Interventions:     Mentation Interventions: Adequate sleep, hydration, pain control, Door open when patient unattended, More frequent rounding, Toileting rounds, Update white board     Elimination Interventions: Call light in reach, Toileting schedule/hourly rounds

## 2017-11-14 NOTE — PROGRESS NOTES
Assumed care of pt from [place] , [facility] pt resting in bed, no distress noted  Frequently used items and call bell within reach. [phone] this nurse was notified of critical lab result of MRSA. Dr. Richelle Wen was notified, no orders received.

## 2017-11-14 NOTE — ROUTINE PROCESS
Bedside shift change report given to Hortencia Clay by Camden Spaulding RN. Report included the following information SBAR, Kardex and MAR.

## 2017-11-14 NOTE — PROGRESS NOTES
Called everette at the [facility]. Asked if she can take pt back with mrsa in [place] , they said no not until after treatment. Told her plan is not to treat since hospice. Had gian babin call them, she spoke with everette, pat will check with her supervisor and let mr know.

## 2017-11-14 NOTE — HOSPICE
Referral appreciated. Met with family, paid CG and pt. Awaiting update of dispo d/t MRSA dx. Spoke with care mgr Sammy Moses and provided call back number. [address] [facility] 24/7 phone number: [phone].

## 2017-11-15 NOTE — PROGRESS NOTES
[facility]ed [facility] spoke with elicia , they may have bed for her tomorrow . She would go there on iv vanco for 4 remaining days then return to the [facility]. If they accept , need to notify the [facility] also, [facility] asked elicia to call  office [phone] to  Find out who cm will be and let them speak to them.

## 2017-11-15 NOTE — PROGRESS NOTES
Assumed care of pt from Christiana, RN , pt resting with signs of pain or  distress. Frequently used items and call bell within reach. 1647 pt vancomycin trough not completed at this time . Phlebotomist was called.

## 2017-11-15 NOTE — PROGRESS NOTES
[facility]  Hospitalist Division        Inpatient Daily Progress Note    Daily progress Note    Patient: Autumn Gilman MRN: [id number]  CSN: [id number]    YOB: 1935  Age: 81 y.o. Sex: female    DOA: 11/12/2017 LOS:  LOS: 3 days                    Chief Complaint: Pneumonia         Subjective: MRSA + sputum, unable to d/c back to [place] on hospice until she has completed 7 days of abx. No events overnight- VSS. Objective:      Visit Vitals    /58 (BP 1 Location: Left arm, BP Patient Position: Supine)    Pulse 73    Temp 97.4 °F (36.3 °C)    Resp 18    Ht 4' 8\" (1.422 m)    Wt 40.8 kg (90 lb)    SpO2 92%    Breastfeeding No    BMI 20.18 kg/m2           Physical Exam:  General appearance: resting in bed, alert, in no acute distress  Lungs: clear to auscultation bilaterally  Heart: regular rate and rhythm, S1, S2 normal, no murmur, click, rub or gallop  Abdomen: soft, non tender, non distended . Normoactive bowel sounds.   Extremities: extremities normal, atraumatic, no cyanosis or edema  Skin: Skin color, texture, turgor normal. No rashes or lesions  PSY: appropriately behaved        Intake and Output:  Current Shift:  11/15 0701 - 11/15 1900  In: 120 [P.O.:120]  Out: -   Last three shifts:  11/13 1901 - 11/15 0700  In: 1586.7 [P.O.:410; I.V.:1176.7]  Out: -     Recent Results (from the past 24 hour(s))   METABOLIC PANEL, BASIC    Collection Time: 11/15/17  4:35 AM   Result Value Ref Range    Sodium 144 136 - 145 mmol/L    Potassium 3.9 3.5 - 5.5 mmol/L    Chloride 109 (H) 100 - 108 mmol/L    CO2 25 21 - 32 mmol/L    Anion gap 10 3.0 - 18 mmol/L    Glucose 92 74 - 99 mg/dL    BUN 11 7.0 - 18 MG/DL    Creatinine 0.60 0.6 - 1.3 MG/DL    BUN/Creatinine ratio 18 12 - 20      GFR est AA >60 >60 ml/min/1.73m2    GFR est non-AA >60 >60 ml/min/1.73m2    Calcium 8.5 8.5 - 10.1 MG/DL           Lab Results   Component Value Date/Time Glucose 92 11/15/2017 04:35 AM    Glucose 90 11/14/2017 04:00 AM    Glucose 90 11/13/2017 03:50 AM    Glucose 128 11/12/2017 01:40 PM    Glucose 129 08/20/2017 04:00 AM        Imaging:    CXR 11/12/2017  Impression:        1. Developing atelectasis or infiltrate left lower lobe with possible small left  effusion. 2. Atelectasis seen on prior right lower lobe appears to have resolved.       Medication List Reviewed:  Current Facility-Administered Medications   Medication Dose Route Frequency    sodium chloride (NS) flush 5-10 mL  5-10 mL IntraVENous PRN    acetaminophen (TYLENOL) tablet 650 mg  650 mg Oral Q4H PRN    alum-mag hydroxide-simeth (MYLANTA) oral suspension 30 mL  30 mL Oral QID PRN    escitalopram oxalate (LEXAPRO) tablet 10 mg  10 mg Oral DAILY    lactulose (CHRONULAC) solution 20 g  20 g Oral BID    magnesium hydroxide (MILK OF MAGNESIA) 400 mg/5 mL oral suspension 30 mL  30 mL Oral Q4H PRN    melatonin tablet 3 mg  3 mg Oral QHS    raNITIdine (ZANTAC) tablet 150 mg  150 mg Oral DAILY    risperiDONE (RisperDAL) tablet 0.25 mg  0.25 mg Oral BID    dextrose 5 % - 0.45% NaCl infusion  50 mL/hr IntraVENous CONTINUOUS    ondansetron (ZOFRAN) injection 4 mg  4 mg IntraVENous Q4H PRN    enoxaparin (LOVENOX) injection 30 mg  30 mg SubCUTAneous Q24H    vancomycin (VANCOCIN) 750 mg in 0.9% sodium chloride (MBP/ADV) 250 mL ADV  750 mg IntraVENous Q24H    VANCOMYCIN INFORMATION NOTE   Other ONCE         Assessment/Plan:     Patient Active Problem List   Diagnosis Code    MAG (acute kidney injury) ([facility] [address] N17.9    Falls W19. Heber Gills    Dehydration E86.0    Alzheimer's dementia with behavioral disturbance G30.8, F02.81    Encephalopathy G93.40    HCAP (healthcare-associated pneumonia) J18.9    Anemia D64.9    Dementia in Alzheimer's disease G30.9, F02.80    Thrombocytosis (HCC) D47.3    Dysphagia R13.10    PNA (pneumonia) J18.9    Debility R53.81    ACP (advance care planning) Z71.89    Moderate protein-calorie malnutrition (HCC) E44.0    Functional quadriplegia (HCC) R53.2       A/P:  Pneumonia- sputum culture + MRSA-  CXR with developing atelectasis or infiltrate left lower lobe with possible small left effusion. Continue Vancomycin- will d/c Levaquin. Afebrile w/o leukocytosis. Dysphagia- s/p speech eval-recommend puree/honey-thick liquid diet, pt MUST be fed at slow rate, with HOB upright and provided 1:1 cues for swallow. Alzheimer's Dementia with behavorial disturbance- Aricept, Lexapro, Namenda, Risperdal  HLD- Atorvastatin 20 mg daily  DVT Prophylaxis- Lovenox 30 mg daily  Disposition-d/c back to [facility] on hospice- but they are unable to accept her until she has been treated for MRSA for 7 days- this is day 3.     Pt is DNR/DNI      JERI Munoz  [address] [place] [address]  Hospitalist Division  Pager:  [phone]  Office:  [phone]

## 2017-11-15 NOTE — PROGRESS NOTES
Patient's Vanco trough 7.0, called spoke with Joseline Galarza (Pharmacist) okay to hang Vancomycin 750 mg IV. Patient awake eating dinner beinging fed by Ms. Lilly Castellanos (family care giver); daughter and niece at bedside. HOB elevated, call bell w/in reach.

## 2017-11-15 NOTE — PROGRESS NOTES
Received awake,alert,confused. Family members at bedside. 11/15/17 0600 Uneventful shift. Terri care for urinary incontinence.

## 2017-11-15 NOTE — PROGRESS NOTES
Spoke with  dtr agrees to snf if able at [employer] posted in epic, left message for Lien there to call me.

## 2017-11-15 NOTE — PROGRESS NOTES
Problem: Falls - Risk of  Goal: *Absence of Falls  Document Jon Fall Risk and appropriate interventions in the flowsheet. Outcome: Progressing Towards Goal  Fall Risk Interventions:       Mentation Interventions: Door open when patient unattended, Adequate sleep, hydration, pain control    Medication Interventions: Patient to call before getting OOB    Elimination Interventions:  Toilet paper/wipes in reach, Patient to call for help with toileting needs

## 2017-11-15 NOTE — ROUTINE PROCESS
Bedside and Verbal shift change report given to VAMSI Villeda (oncoming nurse) by Stefanie Matthews RN (offgoing nurse). Report included the following information SBAR, Kardex, Intake/Output, MAR and Recent Results.

## 2017-11-15 NOTE — PROGRESS NOTES
Pharmacy Dosing Services: Vancomycin    Indication: HAP    Day of therapy: 3    Other Antimicrobials (Include dose, start day & day of therapy):    Loading dose (date given): 1250 mg  Current Maintenance dose: 500 mg every 12 hours    Goal Vancomycin Level: 15-20  (Trough 15-20 for most infections, 20 for meningitis/osteomyelitis, pre-HD level ~25)    Vancomycin Level (if drawn): none at this itme     Significant Cultures:   -sputum: GPC, S. Aureus, **METHICILLIN RESISTANT STAPHYLOCOCCUS AUREUS**   Blood - NGTD, pending   Urine - NGTD    Renal function stable? (unstable defined as SCr increase of 0.5 mg/dL or > 50% increase from baseline, whichever is greater) (Y/N): Y     CAPD, Hemodialysis or Renal Replacement Therapy (Y/N): N     Recent Labs      11/15/17   0435  17   0400  17   0350   CREA  0.60  0.66  0.64   BUN  11    13   WBC   --   7.5  8.5     Temp (24hrs), Av.4 °F (36.3 °C), Min:97.4 °F (36.3 °C), Max:97.4 °F (36.3 °C)    Creatinine Clearance (Creatinine Clearance (ml/min)): 47.4 ml/min     Regimen assessment:  Low level of 7 mcg/mL, increased dose   Maintenance dose: 500 mg IV every 12 hours   Next scheduled level: 2017 before 1700 dose       Pharmacy will follow daily and adjust medications as appropriate for renal function and/or serum levels. Thank you,  [employer] Heena SOARES

## 2017-11-15 NOTE — PROGRESS NOTES
Spoke with zhang at [facility] to see if could take pt  7 days on iv vanco, but they do not have pvt room. Called left message for hospice house in [place] and in [place].

## 2017-11-16 NOTE — PROGRESS NOTES
Problem: Falls - Risk of  Goal: *Absence of Falls  Document Jon Fall Risk and appropriate interventions in the flowsheet. Outcome: Progressing Towards Goal  Fall Risk Interventions:       Mentation Interventions: Door open when patient unattended    Medication Interventions: Patient to call before getting OOB, Bed/chair exit alarm    Elimination Interventions:  Toilet paper/wipes in reach, Toileting schedule/hourly rounds, Call light in reach

## 2017-11-16 NOTE — ROUTINE PROCESS
Bedside and Verbal shift change report given to VAMSI Villeda (oncoming nurse) by Kelly Bean RN (offgoing nurse). Report included the following information SBAR, Kardex, Intake/Output, MAR and Recent Results.

## 2017-11-16 NOTE — PROGRESS NOTES
Patient received in bed with eyes closed. Unable to assess orientation d/t hx of dementia. Patient occasionally speaks incomprehensible words. BI negative with no apparent signs of pain or distress. Bed locked in low position and call bell w/in reach.

## 2017-11-16 NOTE — PROGRESS NOTES
[facility]  Hospitalist Division        Inpatient Daily Progress Note    Daily progress Note    Patient: Jann Olivares MRN: [id number]  CSN: [id number]    YOB: 1935  Age: 81 y.o. Sex: female    DOA: 11/12/2017 LOS:  LOS: 4 days                    Chief Complaint: Pneumonia         Subjective: No events overnight. Daughter at bedside. Understands [facility] will not accept pt until she's had 7 days of IV abx for +MRSA. Objective:      Visit Vitals    /85 (BP 1 Location: Left arm, BP Patient Position: At rest)    Pulse 79    Temp 98.1 °F (36.7 °C)    Resp 17    Ht 4' 8\" (1.422 m)    Wt 40.8 kg (90 lb)    SpO2 98%    Breastfeeding No    BMI 20.18 kg/m2           Physical Exam:  General appearance: resting in bed, alert, in no acute distress  Lungs: clear to auscultation bilaterally  Heart: regular rate and rhythm, S1, S2 normal, no murmur, click, rub or gallop  Abdomen: soft, non tender, non distended . Normoactive bowel sounds. Extremities: extremities normal, atraumatic, no cyanosis or edema  Skin: Skin color, texture, turgor normal. No rashes or lesions  PSY: appropriately behaved        Intake and Output:  Current Shift:     Last three shifts:  11/14 1901 - 11/16 0700  In: 2480.8 [P.O.:790; I.V.:1690.8]  Out: -     Recent Results (from the past 24 hour(s))   VANCOMYCIN, TROUGH    Collection Time: 11/15/17  4:35 PM   Result Value Ref Range    Vancomycin,trough 7.0 (L) 10.0 - 20.0 ug/mL    Reported dose date: [id number]      Reported dose time: 1600      Reported dose: 750 MG UNITS           Lab Results   Component Value Date/Time    Glucose 92 11/15/2017 04:35 AM    Glucose 90 11/14/2017 04:00 AM    Glucose 90 11/13/2017 03:50 AM    Glucose 128 11/12/2017 01:40 PM    Glucose 129 08/20/2017 04:00 AM        Imaging:    CXR 11/12/2017  Impression:        1.  Developing atelectasis or infiltrate left lower lobe with possible small left  effusion. 2. Atelectasis seen on prior right lower lobe appears to have resolved.       Medication List Reviewed:  Current Facility-Administered Medications   Medication Dose Route Frequency    vancomycin (VANCOCIN) 500 mg in 0.9% sodium chloride (MBP/ADV) 100 mL  500 mg IntraVENous Q12H    [START ON 11/17/2017] VANCOMYCIN INFORMATION NOTE   Other ONCE    VANCOMYCIN INFORMATION NOTE   Other Rx Dosing/Monitoring    sodium chloride (NS) flush 5-10 mL  5-10 mL IntraVENous PRN    acetaminophen (TYLENOL) tablet 650 mg  650 mg Oral Q4H PRN    alum-mag hydroxide-simeth (MYLANTA) oral suspension 30 mL  30 mL Oral QID PRN    escitalopram oxalate (LEXAPRO) tablet 10 mg  10 mg Oral DAILY    lactulose (CHRONULAC) solution 20 g  20 g Oral BID    magnesium hydroxide (MILK OF MAGNESIA) 400 mg/5 mL oral suspension 30 mL  30 mL Oral Q4H PRN    melatonin tablet 3 mg  3 mg Oral QHS    raNITIdine (ZANTAC) tablet 150 mg  150 mg Oral DAILY    risperiDONE (RisperDAL) tablet 0.25 mg  0.25 mg Oral BID    dextrose 5 % - 0.45% NaCl infusion  50 mL/hr IntraVENous CONTINUOUS    ondansetron (ZOFRAN) injection 4 mg  4 mg IntraVENous Q4H PRN    enoxaparin (LOVENOX) injection 30 mg  30 mg SubCUTAneous Q24H         Assessment/Plan:     Patient Active Problem List   Diagnosis Code    MAG (acute kidney injury) ([facility] N17.9    Falls W19. Myrl Mon    Dehydration E86.0    Alzheimer's dementia with behavioral disturbance G30.8, F02.81    Encephalopathy G93.40    HCAP (healthcare-associated pneumonia) J18.9    Anemia D64.9    Dementia in Alzheimer's disease G30.9, F02.80    Thrombocytosis (HCC) D47.3    Dysphagia R13.10    PNA (pneumonia) J18.9    Debility R53.81    ACP (advance care planning) Z71.89    Moderate protein-calorie malnutrition (HCC) E44.0    Functional quadriplegia (HCC) R53.2       A/P:  Pneumonia- sputum culture + MRSA-  CXR with developing atelectasis or infiltrate left lower lobe with possible small left effusion. Continue Vancomycin. Afebrile w/o leukocytosis. Dysphagia- s/p speech eval-recommend puree/honey-thick liquid diet, pt MUST be fed at slow rate, with HOB upright and provided 1:1 cues for swallow. Continue gentle IVF's. Alzheimer's Dementia with behavorial disturbance- Aricept, Lexapro, Namenda, Risperdal  HLD- Atorvastatin 20 mg daily  DVT Prophylaxis- Lovenox 30 mg daily  Disposition-d/c back to [facility] on hospice- but they are unable to accept her until she has been treated for MRSA for 7 days- this is day 4. [facility] does not have a private room.       Pt is DNR/DNI      JERI Latham  [address] [place] [address]  Hospitalist Division  Pager:  [phone]  Office:  [phone]

## 2017-11-16 NOTE — PROGRESS NOTES
Received drowsy,no s/s distress. family members visiting. 2230 Fed applesauce & pudding,tolerated well.  11/16/17 0630 Unevenful shift.

## 2017-11-16 NOTE — MANAGEMENT PLAN
Discharge Planning    [facility] CLOSED at [facility], they have declined due to no private rooms        James Redman RN BSN  Outcomes Manager  Pager # [phone]

## 2017-11-16 NOTE — PROGRESS NOTES
NUTRITION follow-up/Plan of care     RECOMMENDATIONS:   1. Pureed w/ HTL  2. SF CIB BID  3. Monitor weight and PO intake  4. RD to follow     GOALS:   1. Ongoing: PO intake meets >75% of protein/calorie needs by 11/21  2. Ongoing: Maintain weight (+/- 1-2 lb) by 11/23    Assessment:   Per BMI of 20.2, weight is in the normal classification. However, patient is at nutrition risk since patient is > 72years old with BMI <23 + 10% weight loss in 3 months which is significant. PO intake is improvingl. Labs noted. Nutrition recommendations listed. RD to follow. Nutrition Risk:  []   High [x]  Moderate [] Low    Subjective/objective:   Pt admitted for PNA. She has h/o End-Stage Alzheimer's, dementia, CAD, COPD, and HLD. Per [facility] home, pt has a choking episode. She lives at the [facility] and she has been on a pureed with honey-thick liquids since August. Per SLP, pt must be fed with HOB upright. Pt has dementia and she is non-verbal. She likes chocolate flavor supplement. She doesn't like eggs. UBW is around 100 lb. Per daughter, appetite was good but this morning she is very sleepy. Information Obtained From:   [x] Chart Review  [x] Patient  [x] Family/Caregiver  [] Nurse/Physician   [] Patient Rounds/Interdisciplinary Meeting    Diet: Pureed w/ HTL  Patient Vitals for the past 100 hrs:   % Diet Eaten   11/15/17 1813 100 %   11/15/17 1338 100 %   11/15/17 0905 90 %   11/14/17 1759 100 %   11/14/17 0917 95 %   11/13/17 1834 100 %     Medications: [x] Reviewed   Encounter Diagnoses     ICD-10-CM ICD-9-CM   1. Aspiration into airway, initial encounter T17.908A 934.9   2. Dementia in Alzheimer's disease G30.9 331.0    F02.80 294.10   3. ACP (advance care planning) Z71.89 V65.49   4. Pneumonia due to infectious organism, unspecified laterality, unspecified part of lung J18.9 136.9     484.8   5.  Alzheimer's dementia with behavioral disturbance, unspecified timing of dementia onset G30.8 331.0    F02.81 294.11 6. Dysphagia, unspecified type R13.10 787.20   7.  Debility R53.81 799.3     Past Medical History:   Diagnosis Date    Alcoholism ([facility]     Alzheimer's dementia     CAD (coronary artery disease)     COPD     DEMENTIA     High cholesterol     Hyperammonemia (HCC)     Hypertension      Labs:  Lab Results   Component Value Date/Time    Sodium 144 11/15/2017 04:35 AM    Potassium 3.9 11/15/2017 04:35 AM    Chloride 109 11/15/2017 04:35 AM    CO2 25 11/15/2017 04:35 AM    Anion gap 10 11/15/2017 04:35 AM    Glucose 92 11/15/2017 04:35 AM    BUN 11 11/15/2017 04:35 AM    Creatinine 0.60 11/15/2017 04:35 AM    Calcium 8.5 11/15/2017 04:35 AM    Magnesium 2.0 06/18/2017 09:25 AM    Albumin 3.0 11/12/2017 01:40 PM     Anthropometrics: BMI (calculated): 20.2 Last 3 Recorded Weights in this Encounter    11/12/17 1432   Weight: 40.8 kg (90 lb)    Ht Readings from Last 1 Encounters:   11/12/17 4' 8\" (1.422 m)     []  Weight Loss  []  Weight Gain  []  Weight Stable   [x]  New wt n/a on record     Estimated Nutrition Needs:   1200 Kcals/day  Protein (g): 50 g    Nutrition Problems Identified:   [x] Suboptimal PO intake   [] Food Allergies  [x] Difficulty chewing/swallowing/poor dentition  [] Constipation/Diarrhea   [] Nausea/Vomiting   [] None  [] Other:     Plan:   [x] Therapeutic Diet  [x]  Obtained/adjusted food preferences/tolerances and/or snacks options   [x]  Supplements added   [x] Occupational therapy following for feeding techniques  []  HS snack added   [x]  Modify diet texture   []  Modify diet for food allergies   []  Educate patient   []  Assist with menu selection   [x]  Monitor PO intake on meal rounds   [x]  Continue inpatient monitoring and intervention   []  Participated in discharge planning/Interdisciplinary rounds/Team meetings   []  Other:     Education Needs:   [] Not appropriate for teaching at this time due to:   [x] Identified and addressed    Nutrition Monitoring and Evaluation:   [x] Continue inpatient monitoring and interventions    [] Other:     Liz Sinclair, RACIEL  Pager: [phone]

## 2017-11-16 NOTE — PROGRESS NOTES
Patient is unable to communicate  due to her current condition and her resting peacefully at this time. Family present in room with patient.  offered prayer and left Spiritual Care brochure. Chaplains will continue to follow and will provide pastoral care on an as needed/requested basis.     Adan Delgadillo   Spiritual Care   [phone]

## 2017-11-16 NOTE — ROUTINE PROCESS
Bedside and Verbal shift change report given to Cora Stone (oncoming nurse) by David fowler rn (offgoing nurse). Report given with SBAR, Kardex, Intake/Output and Recent Results.

## 2017-11-16 NOTE — PROGRESS NOTES
Problem: Pneumonia: Day 3  Goal: *Describes available resources and support systems  Non verbal,has dementia.

## 2017-11-16 NOTE — HOSPICE
Hospice follow up: Met with the patient and her daughter [address] at bedside. Patient slept through the discussion. Per the patient's daughter the plan is for the patient to be on hospice services after she finishes her course of Vancomycin for MRSA. Per record [facility] declined accepting the patient due to not having a private room.

## 2017-11-17 NOTE — PROGRESS NOTES
1925: Bedside verbal shift report received from [address]. Pt is drowsy in bed, no signs of distress, call light within reach, family at bedside. 0715: Bedside and Verbal shift change report given to Elo Vergara RN (oncoming nurse) by Patrice Vazquez RN (offgoing nurse). Report included the following information SBAR, Kardex, Intake/Output, MAR and Recent Results.

## 2017-11-17 NOTE — PROGRESS NOTES
Pt to be dc'd tomorrow after her am dose of vanco. She will return to the [facility] assisted living facility, with hospice. Spoke with pat  From there, she would like divya colin and scripts today. Called spoke with dr Ellie Ferrari, he will do dc. [facility]ified [facility] need [facility]me delivered today. Spoke to juwan there. [facility] sat 11/18. Pcs completed , envelope in nurses station.

## 2017-11-17 NOTE — DISCHARGE SUMMARY
[facility]    [facility]charge Summary    Patient: Indu Ordaz MRN: [id number]  CSN: [id number]    YOB: 1935  Age: 81 y.o. Sex: female    DOA: 11/12/2017 LOS:  LOS: 5 days   Discharge Date: 11/18/2017     Admission Diagnoses: PNA (pneumonia)    Discharge Diagnoses:    Problem List as of 11/17/2017  Date Reviewed: 12/17/2016          Codes Class Noted - Resolved    Debility ICD-10-CM: R53.81  ICD-9-CM: 799.3  11/13/2017 - Present        ACP (advance care planning) ICD-10-CM: Z71.89  ICD-9-CM: V65.49  11/13/2017 - Present        Moderate protein-calorie malnutrition ([facility] ICD-10-CM: E44.0  ICD-9-CM: 263.0  11/13/2017 - Present        Functional quadriplegia ([facility] ICD-10-CM: R53.2  ICD-9-CM: 780.72  11/13/2017 - Present        * (Principal)PNA (pneumonia) ICD-10-CM: J18.9  ICD-9-CM: 486  11/12/2017 - Present        Encephalopathy ICD-10-CM: G93.40  ICD-9-CM: 348.30  8/20/2017 - Present        HCAP (healthcare-associated pneumonia) ICD-10-CM: J18.9  ICD-9-CM: 814  8/20/2017 - Present        Anemia ICD-10-CM: D64.9  ICD-9-CM: 285.9  8/20/2017 - Present        Dementia in Alzheimer's disease ICD-10-CM: G30.9, F02.80  ICD-9-CM: 331.0, 294.10  8/20/2017 - Present        Thrombocytosis ([facility] ICD-10-CM: D47.3  ICD-9-CM: 238.71  8/20/2017 - Present        Dysphagia ICD-10-CM: R13.10  ICD-9-CM: 787.20  8/20/2017 - Present        MAG (acute kidney injury) ([facility] ICD-10-CM: N17.9  ICD-9-CM: 584.9  12/17/2016 - Present        Falls ICD-10-CM: W19. Amanda Meghana  ICD-9-CM: E888.9  12/17/2016 - Present        Dehydration ICD-10-CM: E86.0  ICD-9-CM: 276.51  12/17/2016 - Present        Alzheimer's dementia with behavioral disturbance ICD-10-CM: G30.8, F02.81  ICD-9-CM: 331.0, 294.11  12/17/2016 - Present              Discharge Condition: Stable    Discharge To: SNF    Consults: Hospitalist    Hospital Course: 80 y/o  female with hx of Alzheimer's dementia with behavioral disturbance and dysphagia presented to the ED after a choking episode @[facility] while eating lunch- honey thickened pureed diet. She had to be suctioned, also had low oxygen saturations in EMS. CXR in the ED with developing atelectasis or infiltrate LLL with possible small left effusion, atelectasis on prior RLL appears to have resolved. She was here in August 2017 for pneumonia. Pt was started on Vancomycin and Levaquin. Sputum culture +MRSA- she continued on Vancomycin IV. Levaquin dc'd due to culture and sensitivity reports. Speech therapy consulted- dysphagia diet pureed with honey thickened. Palliative care consulted and assisted family with finding alternative hospice. She is stable for discharge back to [facility] on hospice.       Physical Exam:  General appearance: resting in bed, alert, in no acute distress  Lungs: clear to auscultation bilaterally  Heart: regular rate and rhythm, S1, S2 normal, no murmur, click, rub or gallop  Abdomen: soft, non tender, non distended . Normoactive bowel sounds.   Extremities: extremities normal, atraumatic, no cyanosis or edema  Skin: Skin color, texture, turgor normal. No rashes or lesions  PSY: appropriately behaved         Significant Diagnostic Studies: labs:   Recent Results (from the past 24 hour(s))   METABOLIC PANEL, BASIC    Collection Time: 11/17/17  5:40 AM   Result Value Ref Range    Sodium 142 136 - 145 mmol/L    Potassium 3.9 3.5 - 5.5 mmol/L    Chloride 106 100 - 108 mmol/L    CO2 27 21 - 32 mmol/L    Anion gap 9 3.0 - 18 mmol/L    Glucose 95 74 - 99 mg/dL    BUN 11 7.0 - 18 MG/DL    Creatinine 0.58 (L) 0.6 - 1.3 MG/DL    BUN/Creatinine ratio 19 12 - 20      GFR est AA >60 >60 ml/min/1.73m2    GFR est non-AA >60 >60 ml/min/1.73m2    Calcium 8.5 8.5 - 10.1 MG/DL         Discharge Medications:     Current Discharge Medication List      CONTINUE these medications which have NOT CHANGED    Details   LORazepam (INTENSOL) 2 mg/mL concentrated solution Take 0.5 mL by mouth every four (4) hours as needed for Anxiety. Max Daily Amount: 6 mg. Qty: 30 mL, Refills: 0      morphine (ROXANOL) 100 mg/5 mL (20 mg/mL) concentrated solution 0.25 mL by SubLINGual route every four (4) hours as needed for Pain. Max Daily Amount: 30 mg.  Qty: 30 mL, Refills: 0      melatonin 3 mg tablet Take 3 mg by mouth nightly. senna-docusate (SENNA LAXATIVE-STOOL SOFTENER) 8.6-50 mg per tablet Take 1 Tab by mouth two (2) times a day. HALOPERIDOL LACTATE (HALDOL INJECTION) Take 1 mg by mouth every four (4) hours as needed (agitation). hydrOXYzine HCl (ATARAX) 25 mg tablet Take 25 mg by mouth every six (6) hours as needed for Itching. acetaminophen (TYLENOL) 325 mg tablet Take 650 mg by mouth every four (4) hours as needed for Pain or Fever. magnesium hydroxide (MENEZES MILK OF MAGNESIA) 400 mg/5 mL suspension Take 30 mL by mouth every four (4) hours as needed for Constipation. ondansetron (ZOFRAN ODT) 4 mg disintegrating tablet Take 1 Tab by mouth every eight (8) hours as needed for Nausea. Qty: 10 Tab, Refills: 0      memantine ER (NAMENDA XR) 28 mg capsule Take 28 mg by mouth daily. QUEtiapine (SEROQUEL) 25 mg tablet Take 12.5 mg by mouth nightly. celecoxib (CELEBREX) 200 mg capsule Take 200 mg by mouth two (2) times a day. donepezil (ARICEPT) 23 mg film coated tablet Take 23 mg by mouth nightly. escitalopram (LEXAPRO) 10 mg tablet Take 10 mg by mouth daily. lactulose (CHRONULAC) 10 gram/15 mL solution Take 20 g by mouth two (2) times a day. mirtazapine (REMERON) 15 mg tablet Take 7.5 mg by mouth nightly. risperiDONE (RISPERDAL) 0.25 mg tablet Take 0.25 mg by mouth two (2) times a day. alum-mag hydroxide-simeth (MAALOX ADVANCED) 200-200-20 mg/5 mL Susp Take 30 mL by mouth four (4) times daily as needed. loperamide (IMODIUM) 2 mg capsule Take 2 mg by mouth every eight (8) hours as needed.          STOP taking these medications       levoFLOXacin (LEVAQUIN) 750 mg tablet Comments:   Reason for Stopping:         amino acids-protein hydrolys (PRO-STAT AWC)  gram-kcal/30 mL liqd Comments:   Reason for Stopping:         atropine 1 % ophthalmic solution Comments:   Reason for Stopping:         guaiFENesin (ROBAFEN) 100 mg/5 mL liquid Comments:   Reason for Stopping:         ferrous sulfate 325 mg (65 mg iron) tablet Comments:   Reason for Stopping:         estrogen, conjugated,-medroxyPROGESTERone (PREMPRO) 0.625-2.5 mg per tablet Comments:   Reason for Stopping:         ergocalciferol (VITAMIN D2) 50,000 unit capsule Comments:   Reason for Stopping:         atorvastatin (LIPITOR) 20 mg tablet Comments:   Reason for Stopping:         fexofenadine (ALLEGRA) 180 mg tablet Comments:   Reason for Stopping:         conjugated estrogens (PREMARIN) 0.625 mg tablet Comments:   Reason for Stopping:         ranitidine (ZANTAC) 150 mg tablet Comments:   Reason for Stopping:               Activity: Activity as tolerated    Diet: Dysphagia diet-pureed and honey thickened    Wound Care: None needed    Follow-up: Follow up with hospice    Discharge time: > 35 mins  Nickolas Daniel NP  11/17/2017, 2:10 PM

## 2017-11-17 NOTE — HOSPICE
Received a phone call from [facility] President  that the patient is scheduled to be discharged from [facility] on tomorrow. Transport time 11 am.  Telephone call to the pt's daughter Ms Perez Party to inquire if [address] [place] [address] had picked up equipment yet and as of yesterday evening they hadn't. Called [facility] and left a vm for Fatimah BELTRAN to coordinate care for the patient so that our vendor can deliver the patient's needed equipment. Received no answer Left her a vm.

## 2017-11-17 NOTE — ROUTINE PROCESS
Bedside and Verbal shift change report given to   RN (oncoming nurse) by Tommi Schilder RN, BSN (offgoing nurse). Report given with SBAR, Kardex, Intake/Output, MAR and Recent Results.

## 2017-11-17 NOTE — ANCILLARY DISCHARGE INSTRUCTIONS
Patient and/or next of kin has been given the [facility] Important Message From Medicare About Your Rights\" letter and all questions were answered. Copy given.

## 2017-11-17 NOTE — PROGRESS NOTES
Left 2 messages for camilo, pts 7th day of treat  With ivvanco  Ends tomorrow sat, pt can return there with hospice, waiting for call back. pt rec iv vanco 11/12-11/18.

## 2017-11-17 NOTE — HOSPICE
Equipment has been ordered to be delivered to [facility] on tomorrow morning in anticipation of the patient being discharged tomorrow. Transport time 11 am.  Notified vendor of transport time and they will make delivery a priority. Spoke with Mian Santos at [facility] who stated the other hospice agency still hasn't picked up their equipment but she has informed them of the urgency and it should be picked up this evening but did not want our vendor to attempt a delivery until tomorrow morning. Please call [address] with any questions or concerns.   [phone])

## 2017-11-17 NOTE — ROUTINE PROCESS
fax'd signed dc summary to everette at the [facility]. Joey Alfred states ok for md to sign dc summary in stead of scripts. copy in envelope.

## 2017-11-17 NOTE — PROGRESS NOTES
11/17/17 @ 12:02  Scheduled transport for discharge scheduled for 11/18/17 at 11:00. No auth required with insurance. Made CARMEN Lundy RN aware.     Milagro Madrid / Care Management Specialist

## 2017-11-17 NOTE — PROGRESS NOTES
[facility]  Hospitalist Division        Inpatient Daily Progress Note    Daily progress Note    Patient: Lito Todd MRN: [id number]  CSN: [id number]    YOB: 1935  Age: 81 y.o. Sex: female    DOA: 11/12/2017 LOS:  LOS: 5 days                    Chief Complaint: Pneumonia, MRSA         Subjective: No events overnight. Plan to d/c back to [facility] on hospice tomorrow. Objective:      Visit Vitals    /43 (BP 1 Location: Left arm, BP Patient Position: At rest)    Pulse 80    Temp 98.6 °F (37 °C)    Resp 14    Ht 4' 8\" (1.422 m)    Wt 40.8 kg (90 lb)    SpO2 99%    Breastfeeding No    BMI 20.18 kg/m2           Physical Exam:  General appearance: resting in bed, alert, in no acute distress  Lungs: clear to auscultation bilaterally  Heart: regular rate and rhythm, S1, S2 normal, no murmur, click, rub or gallop  Abdomen: soft, non tender, non distended . Normoactive bowel sounds. Extremities: extremities normal, atraumatic, no cyanosis or edema  Skin: Skin color, texture, turgor normal. No rashes or lesions  PSY: appropriately behaved        Intake and Output:  Current Shift:     Last three shifts:  11/15 1901 - 11/17 0700  In: 1359.2 [P.O.:240;  I.V.:1119.2]  Out: -     Recent Results (from the past 24 hour(s))   METABOLIC PANEL, BASIC    Collection Time: 11/17/17  5:40 AM   Result Value Ref Range    Sodium 142 136 - 145 mmol/L    Potassium 3.9 3.5 - 5.5 mmol/L    Chloride 106 100 - 108 mmol/L    CO2 27 21 - 32 mmol/L    Anion gap 9 3.0 - 18 mmol/L    Glucose 95 74 - 99 mg/dL    BUN 11 7.0 - 18 MG/DL    Creatinine 0.58 (L) 0.6 - 1.3 MG/DL    BUN/Creatinine ratio 19 12 - 20      GFR est AA >60 >60 ml/min/1.73m2    GFR est non-AA >60 >60 ml/min/1.73m2    Calcium 8.5 8.5 - 10.1 MG/DL           Lab Results   Component Value Date/Time    Glucose 95 11/17/2017 05:40 AM    Glucose 92 11/15/2017 04:35 AM    Glucose 90 11/14/2017 04:00 AM    Glucose 90 11/13/2017 03:50 AM    Glucose 128 11/12/2017 01:40 PM        Imaging:    CXR 11/12/2017  Impression:        1. Developing atelectasis or infiltrate left lower lobe with possible small left  effusion. 2. Atelectasis seen on prior right lower lobe appears to have resolved.       Medication List Reviewed:  Current Facility-Administered Medications   Medication Dose Route Frequency    vancomycin (VANCOCIN) 500 mg in 0.9% sodium chloride (MBP/ADV) 100 mL  500 mg IntraVENous Q12H    VANCOMYCIN INFORMATION NOTE   Other ONCE    VANCOMYCIN INFORMATION NOTE   Other Rx Dosing/Monitoring    sodium chloride (NS) flush 5-10 mL  5-10 mL IntraVENous PRN    acetaminophen (TYLENOL) tablet 650 mg  650 mg Oral Q4H PRN    alum-mag hydroxide-simeth (MYLANTA) oral suspension 30 mL  30 mL Oral QID PRN    escitalopram oxalate (LEXAPRO) tablet 10 mg  10 mg Oral DAILY    lactulose (CHRONULAC) solution 20 g  20 g Oral BID    magnesium hydroxide (MILK OF MAGNESIA) 400 mg/5 mL oral suspension 30 mL  30 mL Oral Q4H PRN    melatonin tablet 3 mg  3 mg Oral QHS    raNITIdine (ZANTAC) tablet 150 mg  150 mg Oral DAILY    risperiDONE (RisperDAL) tablet 0.25 mg  0.25 mg Oral BID    ondansetron (ZOFRAN) injection 4 mg  4 mg IntraVENous Q4H PRN    enoxaparin (LOVENOX) injection 30 mg  30 mg SubCUTAneous Q24H         Assessment/Plan:     Patient Active Problem List   Diagnosis Code    MAG (acute kidney injury) ([facility] [address] N17.9    Falls W19. Dirk Lawman    Dehydration E86.0    Alzheimer's dementia with behavioral disturbance G30.8, F02.81    Encephalopathy G93.40    HCAP (healthcare-associated pneumonia) J18.9    Anemia D64.9    Dementia in Alzheimer's disease G30.9, F02.80    Thrombocytosis (HCC) D47.3    Dysphagia R13.10    PNA (pneumonia) J18.9    Debility R53.81    ACP (advance care planning) Z71.89    Moderate protein-calorie malnutrition (HCC) E44.0    Functional quadriplegia (HCC) R53.2       A/P:  Pneumonia- sputum culture + MRSA-  CXR with developing atelectasis or infiltrate left lower lobe with possible small left effusion. Continue Vancomycin. Afebrile w/o leukocytosis. Dysphagia- s/p speech eval-recommend puree/honey-thick liquid diet, pt MUST be fed at slow rate, with HOB upright and provided 1:1 cues for swallow. Continue gentle IVF's.   Alzheimer's Dementia with behavorial disturbance- Aricept, Lexapro, Namenda, Risperdal  HLD- Atorvastatin 20 mg daily  DVT Prophylaxis- Lovenox 30 mg daily  Disposition-d/c back to [facility] on hospice tomorrow    Pt is DNR/DNI      JERI Latham 84  Pager:  [phone]  Office:  [phone]

## 2017-11-18 NOTE — DISCHARGE INSTRUCTIONS
DISCHARGE SUMMARY from Nurse    PATIENT INSTRUCTIONS:    After general anesthesia or intravenous sedation, for 24 hours or while taking prescription Narcotics:  · Limit your activities  · Do not drive and operate hazardous machinery  · Do not make important personal or business decisions  · Do  not drink alcoholic beverages  · If you have not urinated within 8 hours after discharge, please contact your surgeon on call. Report the following to your surgeon:  · Excessive pain, swelling, redness or odor of or around the surgical area  · Temperature over 100.5  · Nausea and vomiting lasting longer than 4 hours or if unable to take medications  · Any signs of decreased circulation or nerve impairment to extremity: change in color, persistent  numbness, tingling, coldness or increase pain  · Any questions    What to do at Home:  Recommended activity: Bedrest    If you experience any of the following symptoms as listed in discharge teaching as \"When to call for help\", please follow up with your primary care physician and/or call 911. *  Please give a list of your current medications to your Primary Care Provider. *  Please update this list whenever your medications are discontinued, doses are      changed, or new medications (including over-the-counter products) are added. *  Please carry medication information at all times in case of emergency situations. These are general instructions for a healthy lifestyle:    No smoking/ No tobacco products/ Avoid exposure to second hand smoke  Surgeon General's Warning:  Quitting smoking now greatly reduces serious risk to your health.     Obesity, smoking, and sedentary lifestyle greatly increases your risk for illness    A healthy diet, regular physical exercise & weight monitoring are important for maintaining a healthy lifestyle    You may be retaining fluid if you have a history of heart failure or if you experience any of the following symptoms:  Weight gain of 3 pounds or more overnight or 5 pounds in a week, increased swelling in our hands or feet or shortness of breath while lying flat in bed. Please call your doctor as soon as you notice any of these symptoms; do not wait until your next office visit. Recognize signs and symptoms of STROKE:    F-face looks uneven    A-arms unable to move or move unevenly    S-speech slurred or non-existent    T-time-call 911 as soon as signs and symptoms begin-DO NOT go       Back to bed or wait to see if you get better-TIME IS BRAIN. Warning Signs of HEART ATTACK     Call 911 if you have these symptoms:   Chest discomfort. Most heart attacks involve discomfort in the center of the chest that lasts more than a few minutes, or that goes away and comes back. It can feel like uncomfortable pressure, squeezing, fullness, or pain.  Discomfort in other areas of the upper body. Symptoms can include pain or discomfort in one or both arms, the back, neck, jaw, or stomach.  Shortness of breath with or without chest discomfort.  Other signs may include breaking out in a cold sweat, nausea, or lightheadedness. Don't wait more than five minutes to call 911 - MINUTES MATTER! Fast action can save your life. Calling 911 is almost always the fastest way to get lifesaving treatment. Emergency Medical Services staff can begin treatment when they arrive -- up to an hour sooner than if someone gets to the hospital by car. The discharge information has been reviewed with the patient and guardian. The patient and guardian verbalized understanding. Discharge medications reviewed with the patient and caregiver and appropriate educational materials and side effects teaching were provided. ___________________________________________________________________________________________________________________________________           Pneumonia: Care Instructions  Your Care Instructions    Pneumonia is an infection of the lungs.  Most cases are caused by infections from bacteria or viruses. Pneumonia may be mild or very severe. If it is caused by bacteria, you will be treated with antibiotics. It may take a few weeks to a few months to recover fully from pneumonia, depending on how sick you were and whether your overall health is good. Follow-up care is a key part of your treatment and safety. Be sure to make and go to all appointments, and call your doctor if you are having problems. It's also a good idea to know your test results and keep a list of the medicines you take. How can you care for yourself at home? · Take your antibiotics exactly as directed. Do not stop taking the medicine just because you are feeling better. You need to take the full course of antibiotics. · Take your medicines exactly as prescribed. Call your doctor if you think you are having a problem with your medicine. · Get plenty of rest and sleep. You may feel weak and tired for a while, but your energy level will improve with time. · To prevent dehydration, drink plenty of fluids, enough so that your urine is light yellow or clear like water. Choose water and other caffeine-free clear liquids until you feel better. If you have kidney, heart, or liver disease and have to limit fluids, talk with your doctor before you increase the amount of fluids you drink. · Take care of your cough so you can rest. A cough that brings up mucus from your lungs is common with pneumonia. It is one way your body gets rid of the infection. But if coughing keeps you from resting or causes severe fatigue and chest-wall pain, talk to your doctor. He or she may suggest that you take a medicine to reduce the cough. · Use a vaporizer or humidifier to add moisture to your bedroom. Follow the directions for cleaning the machine. · Do not smoke or allow others to smoke around you. Smoke will make your cough last longer.  If you need help quitting, talk to your doctor about stop-smoking programs and medicines. These can increase your chances of quitting for good. · Take an over-the-counter pain medicine, such as acetaminophen (Tylenol), ibuprofen (Advil, Motrin), or naproxen (Aleve). Read and follow all instructions on the label. · Do not take two or more pain medicines at the same time unless the doctor told you to. Many pain medicines have acetaminophen, which is Tylenol. Too much acetaminophen (Tylenol) can be harmful. · If you were given a spirometer to measure how well your lungs are working, use it as instructed. This can help your doctor tell how your recovery is going. · To prevent pneumonia in the future, talk to your doctor about getting a flu vaccine (once a year) and a pneumococcal vaccine (one time only for most people). When should you call for help? Call 911 anytime you think you may need emergency care. For example, call if:  ? · You have severe trouble breathing. ?Call your doctor now or seek immediate medical care if:  ? · You cough up dark brown or bloody mucus (sputum). ? · You have new or worse trouble breathing. ? · You are dizzy or lightheaded, or you feel like you may faint. ? Watch closely for changes in your health, and be sure to contact your doctor if:  ? · You have a new or higher fever. ? · You are coughing more deeply or more often. ? · You are not getting better after 2 days (48 hours). ? · You do not get better as expected. Where can you learn more? Go to http://mahamed-marcela.info/. Enter [id number] in the search box to learn more about \"Pneumonia: Care Instructions. \"  Current as of: May 12, 2017  Content Version: 11.4  © 8506-1049 [employer]. Care instructions adapted under license by [employer] (which disclaims liability or warranty for this information).  If you have questions about a medical condition or this instruction, always ask your healthcare professional. Jr Chavez disclaims any warranty or liability for your use of this information.

## 2017-11-18 NOTE — PROGRESS NOTES
Patient received in bed asleep. Patient not alert and not oriented, Pt is nonverbal.   Behavior indicators negative for pain and discomfort. Patient resting quietly. Frequent use items within reach. Bed locked in low position. Call bell within reach. 0805:  Received call from [facility] to confirm that Pt is still leaving at 1100.      0900:  Family at bedside. 1131:  Pt discharged to [facility], accompanied by [facility]. Pt's daughter has all discharge instructions and belongings. Pt's daughter voiced understanding of all discharge instructions.

## 2017-11-18 NOTE — PROGRESS NOTES
Patient received in bed awake. Patient not oriented and non-verbal, behavior indicators negative for pain and discomfort. Patient resting quietly. Frequent use items within reach. Bed locked in low position. Call bell within reach. 1250:  Family at bedside. 2010:  Bedside and Verbal shift change report given to VAMSI Peck (oncoming nurse) by Solomon Ashford RN BSN (offgoing nurse). Report included the following information SBAR, Kardex, Intake/Output, MAR and Recent Results.

## 2017-11-18 NOTE — PROGRESS NOTES
2030: [facility] Pt is resting in in bed, no signs of distress, call light within reach, family at bedside. 6481: Bedside and Verbal shift change report given to Buck rodriguez RN (oncoming nurse) by Aleyda Mensah RN (offgoing nurse). Report included the following information SBAR, Kardex, Intake/Output, MAR and Recent Results.

## 2017-11-18 NOTE — PROGRESS NOTES
Hospital to SNF SBAR Handoff - Ardie Letters                                                                        81 y.o.   female    [facility]   Room: 2106/01    [facility] 2W NEURO MED  Unit Phone# : [phone]    [facility] 2W NEURO MED  [address] [place] [address]  Dept: [phone]  Loc: [phone]                    SITUATION     Admitted:  11/12/2017         Attending Provider:  No att. providers found       Consultations:  IP CONSULT TO HOSPITALIST  IP CONSULT TO R[facility] [address] - PROVIDER    PCP:  Ameena Barr MD   [phone]    Treatment Team: Consulting Provider: Vinita Dawn MD; Consulting Provider: Nina Mayfield MD; Charge Nurse: Latricia Weir RN; Utilization Review: Nisha Sosa; Charge Nurse: Yann Gonsalez RN    Admitting Dx:  PNA (pneumonia)       Principal Problem: PNA (pneumonia)    * No surgery found * of      BY: * Surgery not found *             ON: * No surgery found *                  Code Status: DNR                Advance Directives:   Advance Care Planning 11/12/2017   Patient's Healthcare Decision Maker is: Legal Next of David 70   Primary Decision Maker Name Fabian Perry   Primary Decision Maker Phone Number    Primary Decision Maker Relationship to Patient Adult child   Secondary Decision Maker Name -   Secondary Decision [address] Phone Number -   Secondary Decision Maker Relationship to Patient -   Confirm Advance Directive Yes, on file   Does the patient have other document types Do Not Resuscitate    (Send w/patient)   Yes Not W Pt       Isolation:  There are currently no Active Isolations       MDRO: MRSA    Pain Medications given:  None    Last dose: None Given     Special Equipment needed: no  Type of equipment: None         BACKGROUND     Allergies:   Allergies   Allergen Reactions    Cephalexin Hives    Skelaxin [Metaxalone] Unknown (comments)       Past Medical History:   Diagnosis Date    Alcoholism ([facility] [address]  Alzheimer's dementia     CAD (coronary artery disease)     COPD     DEMENTIA     High cholesterol     Hyperammonemia (HCC)     Hypertension        No past surgical history on file. No prescriptions prior to admission. Hard scripts included in transfer packet no    Vaccinations:    Immunization History   Administered Date(s) Administered    Influenza Vaccine 10/04/2017    Tdap 10/14/2014       Readmission Risks:    Known Risks: Unknown        The Charlson CoMorbitiy Index tool is an evidenced based tool that has more automatic generated information. The tool looks at many different items such as the age of the patient, how many times they were admitted in the last calendar year, current length of stay in the hospital and their diagnosis. All of these items are pulled automatically from information documented in the chart from various places and will generate a score that predicts whether a patient is at low (less than 13), medium (13-20) or high (21 or greater) risk of being readmitted.         ASSESSMENT                Temp: 98.3 °F (36.8 °C) (11/18/17 0553) Pulse (Heart Rate): (!) 57 (11/18/17 1125)     Resp Rate: 20 (11/18/17 1125)           BP: 138/71 (11/18/17 1125)     O2 Sat (%): 95 % (11/18/17 1125)     Weight: 40.8 kg (90 lb)    Height: 4' 8\" (142.2 cm) (11/12/17 1432)       If above not within 1 hour of discharge:    BP:_____  P:____  R:____ T:_____ O2 Sat: ___%  O2: ______    Active Orders   Diet    DIET DYSPHAGIA PUREED (NDD1)  2 HONEY         Orientation: disoriented, non-verbal    Active Behaviors: None                                   Active Lines/Drains:  (Peg Tube / Cervantes / CL or S/L?): no    Urinary Status: Voiding, Incontinent     Last BM: Last Bowel Movement Date: 11/16/17     Skin Integrity: Intact             Mobility: Very limited   Weight Bearing Status: NWB (Non Weight Bearing)                Lab Results   Component Value Date/Time    Glucose 95 11/17/2017 05:40 AM    INR 1.1 08/21/2017 04:10 AM    HGB 7.4 11/14/2017 04:00 AM    HGB 7.3 11/13/2017 03:50 AM        RECOMMENDATION     See After Visit Summary (AVS) for:  · Discharge instructions  · After [address]   · Special equipment needed (entered pre-discharge by Care Management)  · Medication Reconciliation    · Follow up Appointment(s)         Report given/sent by:  Erick Canales RN                    Verbal report given to: Noemi Raza  FAXED to:  [phone]       Estimated discharge time:  11/18/2017 at [phone]

## 2017-11-18 NOTE — PROGRESS NOTES
Pharmacy Dosing Services: Vancomycin    Indication: HAP    Day of therapy: 5    Other Antimicrobials (Include dose, start day & day of therapy):  N/A      Loading dose (date given): 1250 mg   Current Maintenance dose: 750 mg every 12 hours    Goal Vancomycin Level: 15-20  (Trough 15-20 for most infections, 20 for meningitis/osteomyelitis, pre-HD level ~25)    Vancomycin Level (if drawn): 13.7 mcg/mL after 12.433 hours    Significant Cultures:   -sputum: GPC, S. Aureus, **METHICILLIN RESISTANT STAPHYLOCOCCUS AUREUS**   Blood - NGTD, pending   Urine - NGTD    Renal function stable? (unstable defined as SCr increase of 0.5 mg/dL or > 50% increase from baseline, whichever is greater) (Y/N): Y     CAPD, Hemodialysis or Renal Replacement Therapy (Y/N): N     Recent Labs      17   0540  11/15/17   0435   CREA  0.58*  0.60   BUN  11  11     Temp (24hrs), Av.6 °F (37 °C), Min:98.6 °F (37 °C), Max:98.6 °F (37 °C)    Creatinine Clearance (Creatinine Clearance (ml/min)): 49 ml/min     Regimen assessment: Trough is low for HAP, increased dose  Maintenance dose: 750 mg every 12 hours  Next scheduled level:  at 1730       Pharmacy will follow daily and adjust medications as appropriate for renal function and/or serum levels. Thank you,  KDPOFCLAIRE SOARES

## 2018-01-01 ENCOUNTER — HOME CARE VISIT (OUTPATIENT)
Dept: HOSPICE | Facility: HOSPICE | Age: 83
End: 2018-01-01
Payer: MEDICARE

## 2018-01-01 ENCOUNTER — HOME CARE VISIT (OUTPATIENT)
Dept: SCHEDULING | Facility: HOME HEALTH | Age: 83
End: 2018-01-01
Payer: MEDICARE

## 2018-01-01 VITALS
HEART RATE: 88 BPM | RESPIRATION RATE: 16 BRPM | TEMPERATURE: 97.2 F | DIASTOLIC BLOOD PRESSURE: 52 MMHG | OXYGEN SATURATION: 92 % | SYSTOLIC BLOOD PRESSURE: 98 MMHG

## 2018-01-01 VITALS
OXYGEN SATURATION: 92 % | DIASTOLIC BLOOD PRESSURE: 62 MMHG | HEART RATE: 88 BPM | RESPIRATION RATE: 16 BRPM | TEMPERATURE: 97.2 F | SYSTOLIC BLOOD PRESSURE: 98 MMHG

## 2018-01-01 VITALS
RESPIRATION RATE: 32 BRPM | TEMPERATURE: 97.8 F | SYSTOLIC BLOOD PRESSURE: 80 MMHG | HEART RATE: 108 BPM | DIASTOLIC BLOOD PRESSURE: 56 MMHG | OXYGEN SATURATION: 90 %

## 2018-01-01 VITALS — OXYGEN SATURATION: 73 % | TEMPERATURE: 98.2 F | RESPIRATION RATE: 18 BRPM | HEART RATE: 88 BPM

## 2018-01-01 PROCEDURE — G0156 HHCP-SVS OF AIDE,EA 15 MIN: HCPCS

## 2018-01-01 PROCEDURE — 0651 HSPC ROUTINE HOME CARE

## 2018-01-01 PROCEDURE — A4927 NON-STERILE GLOVES: HCPCS

## 2018-01-01 PROCEDURE — G0299 HHS/HOSPICE OF RN EA 15 MIN: HCPCS

## 2018-01-01 PROCEDURE — T4541 LARGE DISPOSABLE UNDERPAD: HCPCS

## 2018-01-01 PROCEDURE — A9270 NON-COVERED ITEM OR SERVICE: HCPCS

## 2018-01-01 PROCEDURE — T4522 ADULT SIZE BRIEF/DIAPER MED: HCPCS

## 2018-01-01 PROCEDURE — G0155 HHCP-SVS OF CSW,EA 15 MIN: HCPCS

## 2018-01-01 PROCEDURE — A6250 SKIN SEAL PROTECT MOISTURIZR: HCPCS

## 2018-05-18 NOTE — HOSPICE
Hospice referral is appreciated. Telephone call to the patient's daughter Ms. Amadou Fraga to discuss hospice services. Explained the hospice philosophy, services available and IDT. Pt was a resident of [facility] and was on service with a different hospice agency. Pt has end stage Alzheimer's with dysphagia. Admitted to [facility] with aspiration pneumonia after choking on food. Ms. Verner Gallery stated that the patient eats very slowly, has to be fed with a spoon only (including liquids),  will pocket food, requires prompting to swallow and takes an extended time to eat. Provided education on preventing aspirations. Ms. Verner Gallery reports she is interviewing other hospice agenices due to not being pleased with the response time of their current provider. Explained the 24/7 nursing availability as well as other hospice benefits. Ms Verner Gallery stated that she will discuss our conversation with her brother who lives in the [facility] area and will  make a decision as to whether they would like to proceed with switching agencies. Provided her with [employer] contact information ([phone]). 1525 Received a call that the patient's children have decided to switch to [facility]. Equipment will need to be switched out (hospital bed, alternating pressure pad, w/c with gel cushion, OBT, oxygen)  Pt already has medium  (adult undergarments, chux, gloves, wash basin) Will order thickener as the pt is on honey thickened liquids. Pt's room assignment at [facility] is 108. Her daughter would like for her brother to sign the hospice consent forms which will be faxed early tomorrow morning ([phone]) Explained the admission process to the patient's daughter. She would still like to meet with the admission's nurse on tomorrow.   Anticipated discharge for tomorrow no dysuria, no frequency, and no hematuria.

## 2022-01-25 NOTE — PROGRESS NOTES
[facility]: [phone] ([phone]  [facility]: [phone]   [facility]: [phone]    Patient Name: Mariaa Winter  YOB: 1935    Date of Initial Consult: 11/13/17  Reason for Consult: care decisions  Requesting Provider: Dr. Shashi Drummond   Primary Care Physician: Lis Cerna MD      SUMMARY:   Mariaa Winter is a 81y.o. year old with a past history of Alzheimer's dementia, dysphagia, pneumonia, anemia , who was admitted on 11/12/2017 from [facility] with a diagnosis of pneumonia. Current medical issues leading to Palliative Medicine involvement include: 82 y/o [facility] resident with advanced dementia, has been with [facility] since January 2017, known dysphagia. Patient developed acute SOB immediately after eating, better after suctioning in ED. Palliative medicine is consulted for goals of care decisions. PALLIATIVE DIAGNOSES:   1. ACP/goals of care discussion  2. pneumonia  3. Alzheimer's dementia  4. Dysphagia  5. debility       PLAN:   1. Pt seen at bedside along with Michaelle Ornelas NP. Daughter Dea White and niece Isaac Del Real are at bedside. The patient is  with 2 children, Annie who lives in [address] [place] [address], who lives in [facility]. Per Nic Gonzalez is the MPOA, we do not have a copy on file. Offered to call Caro Conroy to participate in conversation via phone, however Dea White states that he will accept information from her after the discussion. Niece Isaac Del Real is also very involved in the patient's care and she resides here in [place]. Per Dea White the patient was first diagnosed with Alzheimer's 11 years ago. She has lived in the memory unit at [facility] for the past 2.5 years and has been on Hospice Since January. Since January she has been bed/WC bound.   Her family notes progression of her illness as she is now non verbal and has been on a dysphagia diet since August.   2. ACP/goals of care discussion: Dea White and Isaac Del Real acknowledge that they have seen a recent decline in Ms. [facility]. Her daughter notes that the patient signed her DNR herself and that she knows her mother would not want life prolonging treatment in the case of a terminal illness, but would want to focus on comfort and quality of life. She says the patient would not want a feeding tube or intubation. PT is a DNR/DNI. Post form was discussed with Annie and comfort measures recommended. She will discuss with her brother before signing. The family is agreeable to trying hospice again, may consider a different agency if able. 3. Pneumonia: Pt on IV antibiotics per hospitalist team, family agreeable to treatment at this time. Discussed risk of aspiration pneumonia and strategies to avoid future hospitalizations if not consistent with goals of care. 4. Alzheimer's dementia: Per family, pt  Was diagnosed 11 years ago. She has been residing in a memory unit at [facility] for the past 2.5 years. Her health has declined rapidly in the past year ans she is now bed bound, non verbal, dysphagic/pocketing food, and has had a decreased appetite recently. 5. Dysphagia: Pt was admitted with pneumonia is August 2017 and at that time SLP made recs for puree/nectar thick diet. Speech saw today and recommended puree/honey thick. Our team and speech therapist spoke with the family and recommended small bites, allowing patient to dictate when she is finished, no straws and discussed ways to allow her to eat in the safest way possible and to make sure all caregivers understood her feeding restrictions. Annie confirmed that the patient would not want a feeding tube. Discussed the risk of aspiration pneumonia and family reported understanding. 6. Debility: Pt is dependent for all ADLs, has been bedbound since January. Discussed expected functional decline in Alzheimer's disease with patient family.    7. Initial consult note routed to primary continuity provider  8. Communicated plan of care with: Palliative IDT, family, physician       GOALS OF CARE:   Comfort, quality of life    Advance Care Planning 11/12/2017   Patient's Healthcare Decision Maker is: Legal Next of David 70   Primary Decision Maker Name Caleb Cruz   Primary Decision Maker Phone Number    Primary Decision Maker Relationship to Patient Adult child   Secondary Decision Maker Name -   Secondary Decision [address] Phone Number -   Secondary Decision Maker Relationship to Patient -   Confirm Advance Directive Yes, on file   Does the patient have other document types Do Not Resuscitate           HISTORY:     History obtained from: patient family, chart    CHIEF COMPLAINT: SOB    HPI/SUBJECTIVE: See summary, ACP discussion with pt daughter and niece today. Pulse 88. 109/56, 98.6, respirations- 16, last BM today  MAR- lipitor, lexapro, lactulose, levaquin, melatonin, namenda, aricept, zantac, risperdal, vanco  SLP-puree/honey thick  Labs- WBC-8.5, H/H- 7.3/24.6, BUN/creatinine- 13/0.64 UA- negative  CXR- 1. Developing atelectasis or infiltrate left lower lobe with possible small left  effusion. 2. Atelectasis seen on prior right lower lobe appears to have resolved.     The patient is:   [] Verbal and participatory  [x] Non-participatory due to:   Advanced dementia    Clinical Pain Assessment (nonverbal scale for nonverbal patients): Pain: 0  Adult Non-Verbal Pain Assessment    Face  [x] 0   No particular expression or smile  [] 1   Occasional grimace, tearing, frowning, wrinkled forehead  [] 2   Frequent grimace, tearing, frowning, wrinkled forehead    Activity (movement)  [x] 0   Lying quietly, normal position  [] 1   Seeking attention through movement or slow, cautious movement  [] 2   Restless, excessive activity and/or withdrawal reflexes    Guarding  [x] 0   Lying quietly, no positioning of hands over areas of body  [] 1   Splinting areas of the body, tense  [] 2   Rigid, [facility], Division of Infectious Diseases  NETTA Joaquin Y. Patel, S. Shah, G. [facility]  [phone]    Name: KAILYN BERGER  Age: 76y  Gender: Female  MRN: [id number]    Interval History:  Patient seen at bedside this morning  No acute overnight events. Afebrile  Notes reviewed    Antibiotics:  piperacillin/tazobactam IVPB.. 3.375 Gram(s) IV Intermittent every 8 hours      Medications:  benzocaine 20% Spray 1 Spray(s) Topical three times a day PRN  enalaprilat Injectable 1.25 milliGRAM(s) IV Push every 6 hours  influenza  Vaccine (HIGH DOSE) 0.7 milliLiter(s) IntraMuscular once  lactated ringers. 1000 milliLiter(s) IV Continuous <Continuous>  levothyroxine Injectable 87.5 MICROGram(s) IV Push at bedtime  LORazepam   Injectable 0.5 milliGRAM(s) IV Push every 12 hours PRN  metoprolol tartrate Injectable 2.5 milliGRAM(s) IV Push every 6 hours  ondansetron Injectable 4 milliGRAM(s) IV Push every 6 hours PRN  pantoprazole  Injectable 40 milliGRAM(s) IV Push daily  piperacillin/tazobactam IVPB.. 3.375 Gram(s) IV Intermittent every 8 hours      Review of Systems:    Review of systems otherwise negative except as previously noted.    Allergies: predniSONE (Anaphylaxis)    For details regarding the patient's past medical history, social history, family history, and other miscellaneous elements, please refer the initial infectious diseases consultation and/or the admitting history and physical examination for this admission.    Objective:  Vitals:   T(C): 36.3 (22 @ 04:26), Max: 37 (22 @ 20:02)  HR: 69 (22 @ 04:26) (66 - 70)  BP: 169/76 (22 @ 04:26) (138/72 - 169/76)  RR: 18 (22 @ 04:26) (18 - 18)  SpO2: 91% (22 @ 04:26) (91% - 92%)    Physical Examination:  General: no acute distress  HEENT: NC/AT, anicteric, neck supple, +NGT  Respiratory: no acc muscle use, breathing comfortably  Cardiovascular: S1 and S2 present  Gastrointestinal: soft, ND, colostomy  Extremities: no edema, no cyanosis  Skin: no visible rash, clean dressing on abd      Laboratory Studies:  CBC:                       11.3   4.86  )-----------( 134      ( 2022 07:56 )             34.8     CMP:     140  |  101  |  17  ----------------------------<  94  3.5   |  34<H>  |  0.94    Ca    8.7      2022 07:56  Phos  3.4       Mg     2.5         TPro  6.6  /  Alb  2.3<L>  /  TBili  0.9  /  DBili  x   /  AST  15  /  ALT  13  /  AlkPhos  93      LIVER FUNCTIONS - ( 2022 07:56 )  Alb: 2.3 g/dL / Pro: 6.6 g/dL / ALK PHOS: 93 U/L / ALT: 13 U/L / AST: 15 U/L / GGT: x           Urinalysis Basic - ( 2022 13:26 )    Color: Yellow / Appearance: Clear / S.010 / pH: x  Gluc: x / Ketone: Negative  / Bili: Negative / Urobili: Negative   Blood: x / Protein: Negative / Nitrite: Negative   Leuk Esterase: Negative / RBC: x / WBC x   Sq Epi: x / Non Sq Epi: x / Bacteria: x        Microbiology: reviewed    Culture - Urine (collected 22 @ 17:26)  Source: Clean Catch Clean Catch (Midstream)  Final Report (22 @ 13:44):    No growth    Culture - Blood (collected 22 @ 03:39)  Source: .Blood Blood-Peripheral  Preliminary Report (22 @ 04:01):    No growth to date.    Culture - Blood (collected 22 @ 03:39)  Source: .Blood Blood-Peripheral  Preliminary Report (22 @ 04:01):    No growth to date.    Culture - Urine (collected 22 @ 02:31)  Source: Clean Catch Clean Catch (Midstream)  Final Report (22 @ 22:45):    <10,000 CFU/mL Normal Urogenital Conchis        Radiology: reviewed    < from: Xray Abdomen 2 View PORTABLE -Routine (22 @ 10:14) >    ACC: [id number] EXAM:  XR ABDOMEN PORTABLE ROUTINE 2V                          PROCEDURE DATE:  2022          INTERPRETATION:  Clinical history: 76-year-old female, evaluate   progression of SBO.    Supine and left lateral decubitus views are compared to 2022 and   demonstrate slight improvement in the small bowel dilatation with no   gross pneumoperitoneum or acute osseous finding.    Tip of the NG tube remains in the stomach    IMPRESSION:  Slight improvement in small bowel dilatation with no pneumoperitoneum    --- End of Report ---            JORGE L KEE DO; Attending Radiologist  This document has been electronically signed. 2022 10:35AM    < end of copied text >     stiff    Physiology (vital signs)  [x] 0   Stable vital signs  [] 1   Change in any of the following: SBP > 20mm Hg; HR > 20/minute  [] 2   Change in any of the following: SBP > 30mm Hg; HR > 25/minute    Respiratory  [x] 0   Baseline RR/SpO2, compliant with ventilator  [] 1   RR > 10 above baseline, or 5% drop SpO2, mild asynchrony with ventilator  [] 2   RR > 20 above baseline, or 10% drop SpO2, asynchrony with ventilator    Total Non-Verbal Pain Score: 0       FUNCTIONAL ASSESSMENT:     Palliative Performance Scale (PPS): 30          PSYCHOSOCIAL/SPIRITUAL SCREENING:     Advance Care Planning:  Advance Care Planning 11/12/2017   Patient's Healthcare Decision Maker is: Legal Next of David 70   Primary Decision Maker Name Guerrero Monsalve   Primary Decision Maker Phone Number    Primary Decision Maker Relationship to Patient Adult child   Secondary Decision Maker Name -   Secondary Decision [address] Phone Number -   Secondary Decision Maker Relationship to Patient -   Confirm Advance Directive Yes, on file   Does the patient have other document types Do Not Resuscitate        Any spiritual / Confucianist concerns:  [] Yes /  [x] No    Caregiver Burnout:  [] Yes /  [x] No /  [] No Caregiver Present      Anticipatory grief assessment:   [x] Normal  / [] Maladaptive       ESAS Anxiety:      ESAS Depression:          REVIEW OF SYSTEMS:     Positive and pertinent negative findings in ROS are noted above in HPI. The following systems were [] reviewed / [x] unable to be reviewed as noted in HPI  Other findings are noted below. Systems: constitutional, ears/nose/mouth/throat, respiratory, gastrointestinal, genitourinary, musculoskeletal, integumentary, neurologic, psychiatric, endocrine. Positive findings noted below.   Modified ESAS Completed by: provider           Pain: 0           Dyspnea: 0     Constipation: No     Stool Occurrence(s): 1        PHYSICAL EXAM:     Wt Readings from Last 3 Encounters:   11/12/17 40.8 kg (90 lb)   08/19/17 45.4 kg (100 lb)   06/18/17 43.1 kg (95 lb)     Blood pressure 109/56, pulse 88, temperature 98.6 °F (37 °C), resp. rate 16, height 4' 8\" (1.422 m), weight 40.8 kg (90 lb), SpO2 95 %, not currently breastfeeding. Pain:  Pain Scale 1: Adult Nonverbal Pain Scale                    Last bowel movement: today 11/13    Constitutional: frail elderly woman lying on her side in bed, does not verbalize or make eye contact, did nod once  Eyes: pupils equal, anicteric  ENMT: no nasal discharge, moist mucous membranes  Respiratory: breathing not labored, symmetric  Musculoskeletal: no deformity  Skin: warm, dry  Neurologic: does not follow commands         HISTORY:     Principal Problem:    PNA (pneumonia) (11/12/2017)    Active Problems:    Alzheimer's dementia with behavioral disturbance (12/17/2016)      Dysphagia (8/20/2017)      Debility (11/13/2017)      ACP (advance care planning) (11/13/2017)      Past Medical History:   Diagnosis Date    Alcoholism ([facility] [address]     Alzheimer's dementia     CAD (coronary artery disease)     COPD     DEMENTIA     High cholesterol     Hyperammonemia ([facility] [address]     Hypertension       No past surgical history on file. No family history on file. History reviewed, no pertinent family history.   Social History   Substance Use Topics    Smoking status: Former Smoker    Smokeless tobacco: Not on file    Alcohol use Yes     Allergies   Allergen Reactions    Cephalexin Hives    Skelaxin [Metaxalone] Unknown (comments)      Current Facility-Administered Medications   Medication Dose Route Frequency    sodium chloride (NS) flush 5-10 mL  5-10 mL IntraVENous PRN    acetaminophen (TYLENOL) tablet 650 mg  650 mg Oral Q4H PRN    alum-mag hydroxide-simeth (MYLANTA) oral suspension 30 mL  30 mL Oral QID PRN    atorvastatin (LIPITOR) tablet 20 mg  20 mg Oral QHS    donepezil (ARICEPT) film coated tablet 23 mg - PATIENT SUPPLIED (Patient Supplied)  23 mg Oral QHS    escitalopram oxalate (LEXAPRO) tablet 10 mg  10 mg Oral DAILY    ferrous sulfate tablet 325 mg  1 Tab Oral DAILY WITH BREAKFAST    lactulose (CHRONULAC) solution 20 g  20 g Oral BID    magnesium hydroxide (MILK OF MAGNESIA) 400 mg/5 mL oral suspension 30 mL  30 mL Oral Q4H PRN    melatonin tablet 3 mg  3 mg Oral QHS    memantine ER (NAMENDA XR) capsule 28 mg - PATIENT SUPPLIED (Patient Supplied)  28 mg Oral DAILY    raNITIdine (ZANTAC) tablet 150 mg  150 mg Oral DAILY    risperiDONE (RisperDAL) tablet 0.25 mg  0.25 mg Oral BID    levoFLOXacin (LEVAQUIN) 750 mg in D5W IVPB  750 mg IntraVENous Q48H    dextrose 5 % - 0.45% NaCl infusion  50 mL/hr IntraVENous CONTINUOUS    ondansetron (ZOFRAN) injection 4 mg  4 mg IntraVENous Q4H PRN    enoxaparin (LOVENOX) injection 30 mg  30 mg SubCUTAneous Q24H    vancomycin (VANCOCIN) 750 mg in 0.9% sodium chloride (MBP/ADV) 250 mL ADV  750 mg IntraVENous Q24H    [START ON 11/15/2017] VANCOMYCIN INFORMATION NOTE   Other ONCE    Memantine XR 28 mg and donepezil 23 mg - please bring from home  1 Each Other DAILY        LAB AND IMAGING FINDINGS:     Lab Results   Component Value Date/Time    WBC 8.5 11/13/2017 03:50 AM    HGB 7.3 11/13/2017 03:50 AM    PLATELET 020 81/42/9022 03:50 AM     Lab Results   Component Value Date/Time    Sodium 143 11/13/2017 03:50 AM    Potassium 3.9 11/13/2017 03:50 AM    Chloride 110 11/13/2017 03:50 AM    CO2 25 11/13/2017 03:50 AM    BUN 13 11/13/2017 03:50 AM    Creatinine 0.64 11/13/2017 03:50 AM    Calcium 8.7 11/13/2017 03:50 AM    Magnesium 2.0 06/18/2017 09:25 AM      Lab Results   Component Value Date/Time    AST (SGOT) 18 11/12/2017 01:40 PM    Alk.  phosphatase 104 11/12/2017 01:40 PM    Protein, total 8.4 11/12/2017 01:40 PM    Albumin 3.0 11/12/2017 01:40 PM    Globulin 5.4 11/12/2017 01:40 PM     Lab Results   Component Value Date/Time    INR 1.1 08/21/2017 04:10 AM    Prothrombin time 14.0 08/21/2017 04:10 AM      No results found for: IRON, FE, TIBC, IBCT, PSAT, FERR   No results found for: PH, PCO2, PO2  No components found for: [place]   Lab Results   Component Value Date/Time     11/12/2017 01:40 PM    CK - MB 2.7 11/12/2017 01:40 PM              Total time: 100  Counseling / coordination time, spent as noted above: yes  > 50% counseling / coordination?:  Yes, discussion with family, provider, SLP    Prolonged service was provided for  [x]30 min   []75 min in face to face time in the presence of the patient, spent as noted above. Time Start: 0887  Time End: 1130  Note: this can only be billed with  (initial) or 04954 (follow up). If multiple start / stop times, list each separately.